# Patient Record
Sex: MALE | Race: WHITE | NOT HISPANIC OR LATINO | Employment: FULL TIME | URBAN - METROPOLITAN AREA
[De-identification: names, ages, dates, MRNs, and addresses within clinical notes are randomized per-mention and may not be internally consistent; named-entity substitution may affect disease eponyms.]

---

## 2017-01-05 ENCOUNTER — OFFICE VISIT (OUTPATIENT)
Dept: ORTHOPEDICS | Facility: CLINIC | Age: 52
End: 2017-01-05
Payer: COMMERCIAL

## 2017-01-05 VITALS — RESPIRATION RATE: 18 BRPM | HEIGHT: 73 IN | BODY MASS INDEX: 28.89 KG/M2 | WEIGHT: 218 LBS

## 2017-01-05 DIAGNOSIS — M25.811 SHOULDER IMPINGEMENT, RIGHT: ICD-10-CM

## 2017-01-05 DIAGNOSIS — M19.019 AC (ACROMIOCLAVICULAR) JOINT ARTHRITIS: ICD-10-CM

## 2017-01-05 DIAGNOSIS — M75.121 COMPLETE TEAR OF RIGHT ROTATOR CUFF: Primary | ICD-10-CM

## 2017-01-05 PROCEDURE — 99203 OFFICE O/P NEW LOW 30 MIN: CPT | Performed by: ORTHOPAEDIC SURGERY

## 2017-01-05 NOTE — MR AVS SNAPSHOT
After Visit Summary   1/5/2017    Jose G Pena    MRN: [id number]           Patient Information     Date Of Birth          1965        Visit Information        Provider Department      1/5/2017 1:15 PM Kavin Lambert MD [facility]        Care Instructions    Rotator cuff repair is done in same day surgery.    Preop physical with primary physician is needed within 30 days of the surgery.  Nothing to eat or drink for 8 hours before surgery.  For same day surgery you need a ride.  Someone should stay with you for 12 hours afterward.  Stop blood thinners 5 days before surgery (aspirin, warfarin, anti-inflammatories).    Stop Plavix at least 10-14 days before surgery.  General anesthesia is used.  They often perform a pain block at the neck to help with pain.  Sutures are in the wound.  Keep wound dry until clinic appointment at 1 1/2 weeks.     Physical Therapy will start after first clinic visit.  0-3 weeks.  Arm in sling or immobilizer.  No reaching with operative arm.   Okay to have arm out to dangle or bend elbow.    3-6 weeks.  Discontinue immobilizer.  Start reaching.  No lifting over 1 lb.  6-12 weeks  Work on strengthening.  1 year to full recovery.    Call [phone] to schedule your surgery.        Follow-ups after your visit        Who to contact     If you have questions or need follow up information about today's clinic visit or your schedule please contact [facility] directly at [phone].  Normal or non-critical lab and imaging results will be communicated to you by Tingzhart, letter or phone within 4 business days after the clinic has received the results. If you do not hear from us within 7 days, please contact the clinic through [email or web address] or phone. If you have a critical or abnormal lab result, we will notify you by phone as soon as possible.  Submit refill requests through Trendyol or call your pharmacy and they will forward the refill request to  "us. Please allow 3 business days for your refill to be completed.          Additional Information About Your Visit        MyChart Information     [employer] lets you send messages to your doctor, view your test results, renew your prescriptions, schedule appointments and more. To sign up, go to [email or web address] . Click on \"Log in\" on the left side of the screen, which will take you to the Welcome page. Then click on \"Sign up Now\" on the right side of the page.     You will be asked to enter the access code listed below, as well as some personal information. Please follow the directions to create your username and password.     Your access code is: [email or web address]  Expires: 3/6/2017 10:27 AM     Your access code will  in 90 days. If you need help or a new code, please call your [facility] clinic or [phone].        Care EveryWhere ID     This is your Care EveryWhere ID. This could be used by other organizations to access your [facility] medical records  [id number]        Your Vitals Were     Respirations Height BMI (Body Mass Index)             18 1.854 m (6' 1\") 28.77 kg/m2          Blood Pressure from Last 3 Encounters:   16 158/86   16 148/98   14 140/90    Weight from Last 3 Encounters:   17 98.884 kg (218 lb)   16 98.884 kg (218 lb)   16 99.791 kg (220 lb)              Today, you had the following     No orders found for display       Primary Care Provider    None       No address on file        Thank you!     Thank you for choosing [facility]  for your care. Our goal is always to provide you with excellent care. Hearing back from our patients is one way we can continue to improve our services. Please take a few minutes to complete the written survey that you may receive in the mail after your visit with us. Thank you!             Your Updated Medication List - Protect others around you: Learn how to safely use, store and throw away your medicines at " www.disposemymeds.org.          This list is accurate as of: 1/5/17  1:51 PM.  Always use your most recent med list.                   Brand Name Dispense Instructions for use    sulfamethoxazole-trimethoprim 800-160 MG per tablet    BACTRIM DS/SEPTRA DS    20 tablet    Take 1 tablet by mouth 2 times daily

## 2017-01-05 NOTE — PATIENT INSTRUCTIONS
Rotator cuff repair is done in same day surgery.    Preop physical with primary physician is needed within 30 days of the surgery.  Nothing to eat or drink for 8 hours before surgery.  For same day surgery you need a ride.  Someone should stay with you for 12 hours afterward.  Stop blood thinners 5 days before surgery (aspirin, warfarin, anti-inflammatories).    Stop Plavix at least 10-14 days before surgery.  General anesthesia is used.  They often perform a pain block at the neck to help with pain.  Sutures are in the wound.  Keep wound dry until clinic appointment at 1 1/2 weeks.     Physical Therapy will start after first clinic visit.  0-3 weeks.  Arm in sling or immobilizer.  No reaching with operative arm.   Okay to have arm out to dangle or bend elbow.    3-6 weeks.  Discontinue immobilizer.  Start reaching.  No lifting over 1 lb.  6-12 weeks  Work on strengthening.  1 year to full recovery.    Call [phone] to schedule your surgery.

## 2017-01-05 NOTE — NURSING NOTE
"Chief Complaint   Patient presents with     Consult     Right shoulder injury/MRI results. Patient states he fell playing hockey on 12/4/16 at the [employer]. Pain level 4/10 dull, achy and episodic. Ice makes the pain better and certain movements makes the pain worse. Injuries 30 yrs. ago playing hockey.       Initial Resp 18  Ht 1.854 m (6' 1\")  Wt 98.884 kg (218 lb)  BMI 28.77 kg/m2 Estimated body mass index is 28.77 kg/(m^2) as calculated from the following:    Height as of this encounter: 1.854 m (6' 1\").    Weight as of this encounter: 98.884 kg (218 lb).  BP completed using cuff size: NA (Not Taken)  Thu Humphrey MA      "

## 2017-01-06 PROBLEM — M25.811 SHOULDER IMPINGEMENT, RIGHT: Status: ACTIVE | Noted: 2017-01-06

## 2017-01-06 PROBLEM — M75.121 COMPLETE TEAR OF RIGHT ROTATOR CUFF: Status: ACTIVE | Noted: 2017-01-06

## 2017-01-06 PROBLEM — M19.019 AC (ACROMIOCLAVICULAR) JOINT ARTHRITIS: Status: ACTIVE | Noted: 2017-01-06

## 2017-01-07 NOTE — PROGRESS NOTES
Jose G Pena is a 51 year old male who is seen in consultation at the request of Dr. Vannesa Camacho for right shoulder pain.    Past Medical History   Diagnosis Date     NO ACTIVE PROBLEMS (aka NONE)        Past Surgical History   Procedure Laterality Date     Surgical history of -        left wrist tendon repair after injury       History reviewed. No pertinent family history.    Social History     Social History     Marital Status:      Spouse Name: N/A     Number of Children: N/A     Years of Education: N/A     Occupational History     Not on file.     Social History Main Topics     Smoking status: Current Every Day Smoker     Smokeless tobacco: Never Used      Comment: occ, not daily     Alcohol Use: Yes      Comment: every other day, 4 beers at a time, average     Drug Use: No     Sexual Activity:     Partners: Female     Other Topics Concern     Parent/Sibling W/ Cabg, Mi Or Angioplasty Before 65f 55m? No     Social History Narrative    Sales.    , Martha    2 children.       Current Outpatient Prescriptions   Medication Sig Dispense Refill     sulfamethoxazole-trimethoprim (BACTRIM DS,SEPTRA DS) 800-160 MG per tablet Take 1 tablet by mouth 2 times daily 20 tablet 0       No Known Allergies    REVIEW OF SYSTEMS:  CONSTITUTIONAL:  NEGATIVE for fever, chills, change in weight, not feeling tired  SKIN:  NEGATIVE for worrisome rashes, no skin lumps, no skin ulcers and no non-healing wounds  EYES:  NEGATIVE for vision changes or irritation.  ENT/MOUTH:  NEGATIVE.  No hearing loss, no hoarseness, no difficulty swallowing.  RESP:  NEGATIVE. No cough or shortness of breath.  BREAST:  NEGATIVE for masses, tenderness or discharge  CV:  NEGATIVE for chest pain, palpitations or peripheral edema  GI:  NEGATIVE for nausea, abdominal pain, heartburn, or change in bowel habits  :  Negative. No dysuria, no hematuria  MUSCULOSKELETAL:  See HPI above  NEURO:  NEGATIVE . No headaches, no dizziness,  no  "numbness  ENDOCRINE:  NEGATIVE for temperature intolerance, skin/hair changes  HEME/ALLERGY/IMMUNE:  NEGATIVE for bleeding problems  PSYCHIATRIC:  NEGATIVE. no anxiety, no depression.     Exam:  Vitals: Resp 18  Ht 1.854 m (6' 1\")  Wt 98.884 kg (218 lb)  BMI 28.77 kg/m2  BMI= Body mass index is 28.77 kg/(m^2).  Constitutional:  healthy, alert and no distress  Neuro: Alert and Oriented x 3, Gait normal. Sensation grossly WNL.  Psych: Affect normal   Respiratory: Breathing not labored.  Cardiovascular: normal peripheral pulses  Lymph: no adenopathy  Skin: No rashes,worrisome lesions or skin problems    "

## 2017-01-07 NOTE — PROGRESS NOTES
DATE OF VISIT:  2017.        HISTORY OF PRESENT ILLNESS:  Nam Sutherland is a 51-year-old male referred from Dr. Vannesa Camacho for evaluation of right shoulder pain.  This started when he fell playing [place] [employer] [place] .  He had pain in the shoulder after that, has dull aching pain rated 4/10.  It hurts when he moves the arm too far, better with ice.  He has had similar problems 30 years ago.  Because of persistent pain, he had MRI scan of the shoulder.  This showed a 2.7 x 2.7 cm rotator cuff tear in the supraspinatus and infraspinatus.  There is some degenerative tendinopathy as well.  There is a possible tear of the upper portion of the subscapularis tendon with some fatty atrophy of the subscapularis.  There was a type 2 acromion.  No significant acromioclavicular arthrosis.  Biceps tendon appeared normal position.      PHYSICAL EXAMINATION:  Tenderness in the subacromial space on the right, also tenderness of the AC joint and prominence of the AC joint.  He has pain with resisted external rotation and mildly with abduction of the shoulder.  There is no pain with resisted internal rotation.  He has negative anterior and posterior apprehension testing.  There is no increased warmth or erythema.  Sensation and circulation are intact.      IMPRESSION:     1.  Right rotator cuff tear.   2.  Impingement.   3.  Clinically, he has acromioclavicular arthrosis but by MRI scan does not have significant overgrowth.      PLAN:  We discussed options and will proceed with right rotator cuff repair and likely acromioplasty and distal clavicle excision at the same time.  He will have a history and physical with his family physician.  We have gone over the recovery expected today.         RAYSA DOS SANTOS MD             D: 2017 20:48   T: 2017 01:26   MT:       Name:     NAM SUTHERLAND   MRN:      [id number]        Account:      [id number]   :      1965           Visit Date:    01/05/2017      Document: [id number]

## 2017-01-11 ENCOUNTER — TELEPHONE (OUTPATIENT)
Dept: ORTHOPEDICS | Facility: CLINIC | Age: 52
End: 2017-01-11

## 2017-02-13 ENCOUNTER — OFFICE VISIT (OUTPATIENT)
Dept: FAMILY MEDICINE | Facility: CLINIC | Age: 52
End: 2017-02-13
Payer: COMMERCIAL

## 2017-02-13 VITALS
HEIGHT: 73 IN | SYSTOLIC BLOOD PRESSURE: 150 MMHG | TEMPERATURE: 98.7 F | BODY MASS INDEX: 26.9 KG/M2 | WEIGHT: 203 LBS | HEART RATE: 90 BPM | DIASTOLIC BLOOD PRESSURE: 94 MMHG

## 2017-02-13 DIAGNOSIS — Z01.818 PREOP GENERAL PHYSICAL EXAM: Primary | ICD-10-CM

## 2017-02-13 DIAGNOSIS — M75.101 TEAR OF RIGHT ROTATOR CUFF, UNSPECIFIED TEAR EXTENT: ICD-10-CM

## 2017-02-13 LAB — HGB BLD-MCNC: 15.3 G/DL (ref 13.3–17.7)

## 2017-02-13 PROCEDURE — 99214 OFFICE O/P EST MOD 30 MIN: CPT | Performed by: PHYSICIAN ASSISTANT

## 2017-02-13 PROCEDURE — 36415 COLL VENOUS BLD VENIPUNCTURE: CPT | Performed by: PHYSICIAN ASSISTANT

## 2017-02-13 PROCEDURE — 85018 HEMOGLOBIN: CPT | Performed by: PHYSICIAN ASSISTANT

## 2017-02-13 NOTE — MR AVS SNAPSHOT
After Visit Summary   2/13/2017    Jose G Pena    MRN: [id number]           Patient Information     Date Of Birth          1965        Visit Information        Provider Department      2/13/2017 10:00 AM Tracy Do PA-C [facility] [place]        Today's Diagnoses     Preop general physical exam    -  1    Tear of right rotator cuff, unspecified tear extent          Care Instructions      Before Your Surgery      Call your surgeon if there is any change in your health. This includes signs of a cold or flu (such as a sore throat, runny nose, cough, rash or fever).    Do not smoke, drink alcohol or take over the counter medicine (unless your surgeon or primary care doctor tells you to) for the 24 hours before and after surgery.    If you take prescribed drugs: Follow your doctor s orders about which medicines to take and which to stop until after surgery.    Eating and drinking prior to surgery: follow the instructions from your surgeon    Take a shower or bath the night before surgery. Use the soap your surgeon gave you to gently clean your skin. If you do not have soap from your surgeon, use your regular soap. Do not shave or scrub the surgery site.  Wear clean pajamas and have clean sheets on your bed.         Follow-ups after your visit        Your next 10 appointments already scheduled     Feb 17, 2017   Procedure with Kavin Lambert MD   [facility] (--)    [address] [place] [address]   [phone]            Mar 02, 2017  1:15 PM CST   Return Visit with Kavin Lambert MD   [facility]    [address]  [place] [address] [phone]              Who to contact     Normal or non-critical lab and imaging results will be communicated to you by MyChart, letter or phone within 4 business days after the clinic has received the results. If you do not hear from us within 7 days, please  "contact the clinic through Jetabroad or phone. If you have a critical or abnormal lab result, we will notify you by phone as soon as possible.  Submit refill requests through Jet[employer] or call your pharmacy and they will forward the refill request to us. Please allow 3 business days for your refill to be completed.          If you need to speak with a  for additional information , please call: [phone]             Additional Information About Your Visit        Jet[employer] Information     [employer] lets you send messages to your doctor, view your test results, renew your prescriptions, schedule appointments and more. To sign up, go to [email or web address] . Click on \"Log in\" on the left side of the screen, which will take you to the Welcome page. Then click on \"Sign up Now\" on the right side of the page.     You will be asked to enter the access code listed below, as well as some personal information. Please follow the directions to create your username and password.     Your access code is: [email or web address]  Expires: 3/6/2017 10:27 AM     Your access code will  in 90 days. If you need help or a new code, please call your [facility] clinic or [phone].        Care EveryWhere ID     This is your Care EveryWhere ID. This could be used by other organizations to access your [facility] medical records  [id number]        Your Vitals Were     Pulse Temperature Height BMI (Body Mass Index)          90 98.7  F (37.1  C) (Oral) 6' 1\" (1.854 m) 26.78 kg/m2         Blood Pressure from Last 3 Encounters:   17 (!) 162/93   16 158/86   16 (!) 148/98    Weight from Last 3 Encounters:   17 203 lb (92.1 kg)   17 218 lb (98.9 kg)   16 218 lb (98.9 kg)              We Performed the Following     Hemoglobin        Primary Care Provider    None       No address on file        Thank you!     Thank you for choosing [facility]  for your care. Our goal is always to provide you " with excellent care. Hearing back from our patients is one way we can continue to improve our services. Please take a few minutes to complete the written survey that you may receive in the mail after your visit with us. Thank you!             Your Updated Medication List - Protect others around you: Learn how to safely use, store and throw away your medicines at www.disposemymeds.org.      Notice  As of 2/13/2017 10:19 AM    You have not been prescribed any medications.

## 2017-02-13 NOTE — NURSING NOTE
"Chief Complaint   Patient presents with     Pre-Op Exam       Initial BP (!) 162/93 (BP Location: Left arm)  Pulse 90  Temp 98.7  F (37.1  C) (Oral)  Ht 6' 1\" (1.854 m)  Wt 203 lb (92.1 kg)  BMI 26.78 kg/m2 Estimated body mass index is 26.78 kg/(m^2) as calculated from the following:    Height as of this encounter: 6' 1\" (1.854 m).    Weight as of this encounter: 203 lb (92.1 kg).  Medication Reconciliation: complete   Joann Izaguirre MA      "

## 2017-02-13 NOTE — PROGRESS NOTES
[facility] [place]  [address]  [place] [address]  [phone]  Dept: [phone]    PRE-OP EVALUATION:  Today's date: 2017    Jose G Pena (: 1965) presents for pre-operative evaluation assessment as requested by Dr. Lambert.  He requires evaluation and anesthesia risk assessment prior to undergoing surgery/procedure for treatment of Right Rotator Cuff Tear.  Proposed procedure: COMBINED ARTHROTOMY SHOULDER, ROTATOR CUFF REPAIR    Date of Surgery/ Procedure: 17  Time of Surgery/ Procedure: 7:30am  Hospital/Surgical Facility: [facility]   Fax number for surgical facility:  Primary Physician: None  Type of Anesthesia Anticipated: to be determined    Patient has a Health Care Directive or Living Will:  NO    1. NO - Do you have a history of heart attack, stroke, stent, bypass or surgery on an artery in the head, neck, heart or legs?  2. NO - Do you ever have any pain or discomfort in your chest?  3. NO - Do you have a history of  Heart Failure?  4. NO - Are you troubled by shortness of breath when: walking on the level, up a slight hill or at night?  5. NO - Do you currently have a cold, bronchitis or other respiratory infection?  6. NO - Do you have a cough, shortness of breath or wheezing?  7. NO - Do you sometimes get pains in the calves of your legs when you walk?  8. NO - Do you or anyone in your family have previous history of blood clots?  9. NO - Do you or does anyone in your family have a serious bleeding problem such as prolonged bleeding following surgeries or cuts?  10. NO - Have you ever had problems with anemia or been told to take iron pills?  11. NO - Have you had any abnormal blood loss such as black, tarry or bloody stools, or abnormal vaginal bleeding?  12. NO - Have you ever had a blood transfusion?  13. NO - Have you or any of your relatives ever had problems with anesthesia?  14. NO - Do you have sleep apnea, excessive snoring or  daytime drowsiness?  15. NO - Do you have any prosthetic heart valves?  16. NO - Do you have prosthetic joints?  17. NO - Is there any chance that you may be pregnant?      HPI:                                                      Brief HPI related to upcoming procedure: Torn rotator cuff, right      See problem list for active medical problems.  Problems all longstanding and stable, except as noted/documented.  See ROS for pertinent symptoms related to these conditions.                                                                                                  .    MEDICAL HISTORY:                                                      Patient Active Problem List    Diagnosis Date Noted     AC (acromioclavicular) joint arthritis 01/06/2017     Priority: Medium     Shoulder impingement, right 01/06/2017     Priority: Medium     Complete tear of right rotator cuff 01/06/2017     Priority: Medium     Tobacco abuse 06/13/2014     Priority: Medium     CARDIOVASCULAR SCREENING; LDL GOAL LESS THAN 160 10/31/2010     Priority: Medium      Past Medical History   Diagnosis Date     NO ACTIVE PROBLEMS (aka NONE)      Past Surgical History   Procedure Laterality Date     Surgical history of -        left wrist tendon repair after injury     No current outpatient prescriptions on file.     OTC products: None, except as noted above    No Known Allergies   Latex Allergy: NO    Social History   Substance Use Topics     Smoking status: Current Every Day Smoker     Smokeless tobacco: Never Used      Comment: occ, not daily     Alcohol use Yes      Comment: every other day, 4 beers at a time, average     History   Drug Use No       REVIEW OF SYSTEMS:                                                    Constitutional, neuro, ENT, endocrine, pulmonary, cardiac, gastrointestinal, genitourinary, musculoskeletal, integument and psychiatric systems are negative, except as otherwise noted.    EXAM:                                           "          BP (!) 162/93 (BP Location: Left arm)  Pulse 90  Temp 98.7  F (37.1  C) (Oral)  Ht 6' 1\" (1.854 m)  Wt 203 lb (92.1 kg)  BMI 26.78 kg/m2    GENERAL APPEARANCE: healthy, alert and no distress     EYES: EOMI, - PERRL     HENT: ear canals and TM's normal and nose and mouth without ulcers or lesions     NECK: no adenopathy, no asymmetry, masses, or scars and thyroid normal to palpation     RESP: lungs clear to auscultation - no rales, rhonchi or wheezes     CV: regular rates and rhythm, normal S1 S2, no S3 or S4 and no murmur, click or rub -     ABDOMEN:  soft, nontender, no HSM or masses and bowel sounds normal     MS: extremities normal- no gross deformities noted, no evidence of inflammation in joints, FROM in all extremities.     SKIN: no suspicious lesions or rashes     NEURO: Normal strength and tone, sensory exam grossly normal, mentation intact and speech normal     PSYCH: mentation appears normal. and affect normal/bright     LYMPHATICS: normal ant/post cervical, supraclavicular nodes    DIAGNOSTICS:                                                      Hemoglobin   Date Value Ref Range Status   02/13/2017 15.3 13.3 - 17.7 g/dL Final       IMPRESSION:                                                    Reason for surgery/procedure: COMBINED ARTHROTOMY SHOULDER, ROTATOR CUFF REPAIR  Diagnosis/reason for consult: Pre op consult    The proposed surgical procedure is considered INTERMEDIATE risk.    REVISED CARDIAC RISK INDEX  The patient has the following serious cardiovascular risks for perioperative complications such as (MI, PE, VFib and 3  AV Block):  No serious cardiac risks  INTERPRETATION: 0 risks: Class I (very low risk - 0.4% complication rate)    The patient has the following additional risks for perioperative complications:  No identified additional risks      ICD-10-CM    1. Preop general physical exam Z01.818 Hemoglobin   2. Tear of right rotator cuff, unspecified tear extent M75.101  "       RECOMMENDATIONS:                                                      APPROVAL GIVEN to proceed with proposed procedure, without further diagnostic evaluation    Patient will follow up after surgery to discuss his blood pressure further.       Signed Electronically by: Tracy Do PA-C    Copy of this evaluation report is provided to requesting physician.    [facility] Preop Guidelines

## 2017-02-14 ENCOUNTER — ANESTHESIA EVENT (OUTPATIENT)
Dept: SURGERY | Facility: AMBULATORY SURGERY CENTER | Age: 52
End: 2017-02-14

## 2017-02-17 ENCOUNTER — ANESTHESIA (OUTPATIENT)
Dept: SURGERY | Facility: AMBULATORY SURGERY CENTER | Age: 52
End: 2017-02-17

## 2017-02-17 ENCOUNTER — SURGERY (OUTPATIENT)
Age: 52
End: 2017-02-17

## 2017-02-17 ENCOUNTER — HOSPITAL ENCOUNTER (OUTPATIENT)
Facility: AMBULATORY SURGERY CENTER | Age: 52
Discharge: HOME | End: 2017-02-17
Attending: ORTHOPAEDIC SURGERY | Admitting: ORTHOPAEDIC SURGERY
Payer: COMMERCIAL

## 2017-02-17 VITALS
OXYGEN SATURATION: 94 % | TEMPERATURE: 97.9 F | RESPIRATION RATE: 12 BRPM | SYSTOLIC BLOOD PRESSURE: 115 MMHG | DIASTOLIC BLOOD PRESSURE: 68 MMHG

## 2017-02-17 DIAGNOSIS — M19.019 AC (ACROMIOCLAVICULAR) JOINT ARTHRITIS: Primary | ICD-10-CM

## 2017-02-17 DIAGNOSIS — M75.121 COMPLETE TEAR OF RIGHT ROTATOR CUFF: ICD-10-CM

## 2017-02-17 DIAGNOSIS — M25.811 SHOULDER IMPINGEMENT, RIGHT: ICD-10-CM

## 2017-02-17 PROCEDURE — 23130 ACROMP/ACROMIONECTOMY PRTL: CPT | Mod: XU,RT

## 2017-02-17 PROCEDURE — G8907 PT DOC NO EVENTS ON DISCHARG: HCPCS

## 2017-02-17 PROCEDURE — 23120 CLAVICULECTOMY PARTIAL: CPT | Mod: RT

## 2017-02-17 PROCEDURE — 23412 REPAIR ROTATOR CUFF CHRONIC: CPT | Mod: RT

## 2017-02-17 PROCEDURE — G8916 PT W IV AB GIVEN ON TIME: HCPCS

## 2017-02-17 PROCEDURE — 23120 CLAVICULECTOMY PARTIAL: CPT | Mod: 59 | Performed by: ORTHOPAEDIC SURGERY

## 2017-02-17 PROCEDURE — 23130 ACROMP/ACROMIONECTOMY PRTL: CPT | Mod: 59 | Performed by: ORTHOPAEDIC SURGERY

## 2017-02-17 PROCEDURE — 23412 REPAIR ROTATOR CUFF CHRONIC: CPT | Mod: RT | Performed by: ORTHOPAEDIC SURGERY

## 2017-02-17 DEVICE — IMPLANTABLE DEVICE: Type: IMPLANTABLE DEVICE | Site: SHOULDER | Status: FUNCTIONAL

## 2017-02-17 RX ORDER — FENTANYL CITRATE 50 UG/ML
25-50 INJECTION, SOLUTION INTRAMUSCULAR; INTRAVENOUS
Status: DISCONTINUED | OUTPATIENT
Start: 2017-02-17 | End: 2017-02-18 | Stop reason: HOSPADM

## 2017-02-17 RX ORDER — ACETAMINOPHEN 325 MG/1
975 TABLET ORAL ONCE
Status: COMPLETED | OUTPATIENT
Start: 2017-02-17 | End: 2017-02-17

## 2017-02-17 RX ORDER — FLUMAZENIL 0.1 MG/ML
0.2 INJECTION, SOLUTION INTRAVENOUS
Status: DISCONTINUED | OUTPATIENT
Start: 2017-02-17 | End: 2017-02-18 | Stop reason: HOSPADM

## 2017-02-17 RX ORDER — GABAPENTIN 300 MG/1
300 CAPSULE ORAL ONCE
Status: COMPLETED | OUTPATIENT
Start: 2017-02-17 | End: 2017-02-17

## 2017-02-17 RX ORDER — PROPOFOL 10 MG/ML
INJECTION, EMULSION INTRAVENOUS PRN
Status: DISCONTINUED | OUTPATIENT
Start: 2017-02-17 | End: 2017-02-17

## 2017-02-17 RX ORDER — NALOXONE HYDROCHLORIDE 0.4 MG/ML
.1-.4 INJECTION, SOLUTION INTRAMUSCULAR; INTRAVENOUS; SUBCUTANEOUS
Status: DISCONTINUED | OUTPATIENT
Start: 2017-02-17 | End: 2017-02-17 | Stop reason: CLARIF

## 2017-02-17 RX ORDER — FENTANYL CITRATE 50 UG/ML
25-50 INJECTION, SOLUTION INTRAMUSCULAR; INTRAVENOUS
Status: DISCONTINUED | OUTPATIENT
Start: 2017-02-17 | End: 2017-02-17

## 2017-02-17 RX ORDER — SODIUM CHLORIDE, SODIUM LACTATE, POTASSIUM CHLORIDE, CALCIUM CHLORIDE 600; 310; 30; 20 MG/100ML; MG/100ML; MG/100ML; MG/100ML
INJECTION, SOLUTION INTRAVENOUS CONTINUOUS
Status: DISCONTINUED | OUTPATIENT
Start: 2017-02-17 | End: 2017-02-18 | Stop reason: HOSPADM

## 2017-02-17 RX ORDER — DEXAMETHASONE SODIUM PHOSPHATE 4 MG/ML
INJECTION, SOLUTION INTRA-ARTICULAR; INTRALESIONAL; INTRAMUSCULAR; INTRAVENOUS; SOFT TISSUE PRN
Status: DISCONTINUED | OUTPATIENT
Start: 2017-02-17 | End: 2017-02-17

## 2017-02-17 RX ORDER — LIDOCAINE 40 MG/G
CREAM TOPICAL
Status: DISCONTINUED | OUTPATIENT
Start: 2017-02-17 | End: 2017-02-18 | Stop reason: HOSPADM

## 2017-02-17 RX ORDER — HYDROMORPHONE HYDROCHLORIDE 1 MG/ML
.3-.5 INJECTION, SOLUTION INTRAMUSCULAR; INTRAVENOUS; SUBCUTANEOUS EVERY 10 MIN PRN
Status: DISCONTINUED | OUTPATIENT
Start: 2017-02-17 | End: 2017-02-18 | Stop reason: HOSPADM

## 2017-02-17 RX ORDER — LIDOCAINE HYDROCHLORIDE 20 MG/ML
INJECTION, SOLUTION INFILTRATION; PERINEURAL PRN
Status: DISCONTINUED | OUTPATIENT
Start: 2017-02-17 | End: 2017-02-17

## 2017-02-17 RX ORDER — ONDANSETRON 2 MG/ML
4 INJECTION INTRAMUSCULAR; INTRAVENOUS EVERY 30 MIN PRN
Status: DISCONTINUED | OUTPATIENT
Start: 2017-02-17 | End: 2017-02-18 | Stop reason: HOSPADM

## 2017-02-17 RX ORDER — ONDANSETRON 4 MG/1
4 TABLET, ORALLY DISINTEGRATING ORAL EVERY 30 MIN PRN
Status: DISCONTINUED | OUTPATIENT
Start: 2017-02-17 | End: 2017-02-18 | Stop reason: HOSPADM

## 2017-02-17 RX ORDER — OXYCODONE AND ACETAMINOPHEN 5; 325 MG/1; MG/1
1-2 TABLET ORAL EVERY 4 HOURS PRN
Qty: 60 TABLET | Refills: 0 | Status: SHIPPED | OUTPATIENT
Start: 2017-02-17 | End: 2017-12-28

## 2017-02-17 RX ORDER — NALOXONE HYDROCHLORIDE 0.4 MG/ML
.1-.4 INJECTION, SOLUTION INTRAMUSCULAR; INTRAVENOUS; SUBCUTANEOUS
Status: DISCONTINUED | OUTPATIENT
Start: 2017-02-17 | End: 2017-02-18 | Stop reason: HOSPADM

## 2017-02-17 RX ORDER — MEPERIDINE HYDROCHLORIDE 25 MG/ML
12.5 INJECTION INTRAMUSCULAR; INTRAVENOUS; SUBCUTANEOUS
Status: DISCONTINUED | OUTPATIENT
Start: 2017-02-17 | End: 2017-02-18 | Stop reason: HOSPADM

## 2017-02-17 RX ORDER — CEFAZOLIN SODIUM 2 G/100ML
2 INJECTION, SOLUTION INTRAVENOUS
Status: COMPLETED | OUTPATIENT
Start: 2017-02-17 | End: 2017-02-17

## 2017-02-17 RX ORDER — KETOROLAC TROMETHAMINE 30 MG/ML
INJECTION, SOLUTION INTRAMUSCULAR; INTRAVENOUS PRN
Status: DISCONTINUED | OUTPATIENT
Start: 2017-02-17 | End: 2017-02-17

## 2017-02-17 RX ORDER — FLUMAZENIL 0.1 MG/ML
0.2 INJECTION, SOLUTION INTRAVENOUS
Status: DISCONTINUED | OUTPATIENT
Start: 2017-02-17 | End: 2017-02-17 | Stop reason: CLARIF

## 2017-02-17 RX ORDER — ONDANSETRON 2 MG/ML
INJECTION INTRAMUSCULAR; INTRAVENOUS PRN
Status: DISCONTINUED | OUTPATIENT
Start: 2017-02-17 | End: 2017-02-17

## 2017-02-17 RX ORDER — HYDRALAZINE HYDROCHLORIDE 20 MG/ML
2.5-5 INJECTION INTRAMUSCULAR; INTRAVENOUS EVERY 10 MIN PRN
Status: DISCONTINUED | OUTPATIENT
Start: 2017-02-17 | End: 2017-02-18 | Stop reason: HOSPADM

## 2017-02-17 RX ORDER — EPHEDRINE SULFATE 50 MG/ML
INJECTION, SOLUTION INTRAMUSCULAR; INTRAVENOUS; SUBCUTANEOUS PRN
Status: DISCONTINUED | OUTPATIENT
Start: 2017-02-17 | End: 2017-02-17

## 2017-02-17 RX ORDER — HYDROXYZINE PAMOATE 25 MG/1
25 CAPSULE ORAL 3 TIMES DAILY PRN
Qty: 30 CAPSULE | Refills: 1 | Status: SHIPPED | OUTPATIENT
Start: 2017-02-17 | End: 2017-12-28

## 2017-02-17 RX ORDER — ACETAMINOPHEN 325 MG/1
975 TABLET ORAL ONCE
Status: DISCONTINUED | OUTPATIENT
Start: 2017-02-17 | End: 2017-02-18 | Stop reason: HOSPADM

## 2017-02-17 RX ORDER — OXYCODONE HYDROCHLORIDE 5 MG/1
5 TABLET ORAL
Status: DISCONTINUED | OUTPATIENT
Start: 2017-02-17 | End: 2017-02-18 | Stop reason: HOSPADM

## 2017-02-17 RX ADMIN — SODIUM CHLORIDE, SODIUM LACTATE, POTASSIUM CHLORIDE, CALCIUM CHLORIDE: 600; 310; 30; 20 INJECTION, SOLUTION INTRAVENOUS at 07:06

## 2017-02-17 RX ADMIN — GABAPENTIN 300 MG: 300 CAPSULE ORAL at 06:30

## 2017-02-17 RX ADMIN — FENTANYL CITRATE 50 MCG: 50 INJECTION, SOLUTION INTRAMUSCULAR; INTRAVENOUS at 07:08

## 2017-02-17 RX ADMIN — PROPOFOL 200 MG: 10 INJECTION, EMULSION INTRAVENOUS at 07:08

## 2017-02-17 RX ADMIN — FENTANYL CITRATE 25 MCG: 50 INJECTION, SOLUTION INTRAMUSCULAR; INTRAVENOUS at 07:43

## 2017-02-17 RX ADMIN — EPHEDRINE SULFATE 5 MG: 50 INJECTION, SOLUTION INTRAMUSCULAR; INTRAVENOUS; SUBCUTANEOUS at 07:52

## 2017-02-17 RX ADMIN — ACETAMINOPHEN 975 MG: 325 TABLET ORAL at 06:30

## 2017-02-17 RX ADMIN — LIDOCAINE HYDROCHLORIDE 60 MG: 20 INJECTION, SOLUTION INFILTRATION; PERINEURAL at 07:08

## 2017-02-17 RX ADMIN — KETOROLAC TROMETHAMINE 30 MG: 30 INJECTION, SOLUTION INTRAMUSCULAR; INTRAVENOUS at 09:33

## 2017-02-17 RX ADMIN — SODIUM CHLORIDE, SODIUM LACTATE, POTASSIUM CHLORIDE, CALCIUM CHLORIDE: 600; 310; 30; 20 INJECTION, SOLUTION INTRAVENOUS at 09:20

## 2017-02-17 RX ADMIN — OXYCODONE HYDROCHLORIDE 5 MG: 5 TABLET ORAL at 10:22

## 2017-02-17 RX ADMIN — FENTANYL CITRATE 25 MCG: 50 INJECTION, SOLUTION INTRAMUSCULAR; INTRAVENOUS at 08:40

## 2017-02-17 RX ADMIN — CEFAZOLIN SODIUM 2 G: 2 INJECTION, SOLUTION INTRAVENOUS at 07:18

## 2017-02-17 RX ADMIN — DEXAMETHASONE SODIUM PHOSPHATE 8 MG: 4 INJECTION, SOLUTION INTRA-ARTICULAR; INTRALESIONAL; INTRAMUSCULAR; INTRAVENOUS; SOFT TISSUE at 07:13

## 2017-02-17 RX ADMIN — ONDANSETRON 4 MG: 2 INJECTION INTRAMUSCULAR; INTRAVENOUS at 09:33

## 2017-02-17 RX ADMIN — FENTANYL CITRATE 50 MCG: 50 INJECTION, SOLUTION INTRAMUSCULAR; INTRAVENOUS at 06:44

## 2017-02-17 RX ADMIN — EPHEDRINE SULFATE 5 MG: 50 INJECTION, SOLUTION INTRAMUSCULAR; INTRAVENOUS; SUBCUTANEOUS at 08:00

## 2017-02-17 RX ADMIN — SODIUM CHLORIDE, SODIUM LACTATE, POTASSIUM CHLORIDE, CALCIUM CHLORIDE: 600; 310; 30; 20 INJECTION, SOLUTION INTRAVENOUS at 06:15

## 2017-02-17 RX ADMIN — CEFAZOLIN SODIUM 1 G: 2 INJECTION, SOLUTION INTRAVENOUS at 09:14

## 2017-02-17 ASSESSMENT — LIFESTYLE VARIABLES: TOBACCO_USE: 1

## 2017-02-17 NOTE — ANESTHESIA CARE TRANSFER NOTE
Patient: Jose G Pena    Procedure(s):  Right Rotator Cuff Repair Salma Luquillo    - Wound Class: I-Clean    Diagnosis: Right Rotator Cuff Tear   Diagnosis Additional Information: No value filed.    Anesthesia Type:   General, ETT, Periph. Nerve Block for postop pain, Peripheral Nerve Block     Note:  Airway :Nasal Cannula  Patient transferred to:Phase II  Comments: To PACU, awake, comfortable, sitting up, sats 100%, NC, Report to RN.      Vitals: (Last set prior to Anesthesia Care Transfer)    CRNA VITALS  2/17/2017 0926 - 2/17/2017 1001      2/17/2017             Pulse: 95    SpO2: 99 %                Electronically Signed By: CHUY Hart CRNA  February 17, 2017  10:01 AM

## 2017-02-17 NOTE — ANESTHESIA POSTPROCEDURE EVALUATION
Patient: Jose G Pena    Procedure(s):  Right Rotator Cuff Repair Salma Lonedell    - Wound Class: I-Clean    Diagnosis:Right Rotator Cuff Tear   Diagnosis Additional Information: No value filed.    Anesthesia Type:  General, Periph. Nerve Block for postop pain, Peripheral Nerve Block    Note:  Anesthesia Post Evaluation    Patient location during evaluation: PACU  Patient participation: Able to fully participate in evaluation  Level of consciousness: awake  Pain management: adequate  Airway patency: patent  Cardiovascular status: acceptable  Respiratory status: acceptable  Hydration status: acceptable  PONV: none     Anesthetic complications: None    Comments: Mild pain on upper part of shoulder.  Otherwise doing well.        Last vitals:  Vitals:    02/17/17 0700 02/17/17 0958 02/17/17 1000   BP: 130/85 (!) 137/92 138/85   Resp: 12 20 16   Temp:  98.5  F (36.9  C)    SpO2: 98% 96% 96%         Electronically Signed By: Kaivn Mayo MD  February 17, 2017  10:31 AM

## 2017-02-17 NOTE — IP AVS SNAPSHOT
MRN:[id number]                      After Visit Summary   2/17/2017    Jose G Pena    MRN: [id number]           Thank you!     Thank you for choosing [facility] for your care. Our goal is always to provide you with excellent care. Hearing back from our patients is one way we can continue to improve our services. Please take a few minutes to complete the written survey that you may receive in the mail after you visit with us. Thank you!        Patient Information     Date Of Birth          1965        About your hospital stay     You were admitted on:  February 17, 2017 You last received care in the:  [facility]    You were discharged on:  February 17, 2017       Who to Call     For medical emergencies, please call 911.  For non-urgent questions about your medical care, please call your primary care provider or clinic, None  For questions related to your surgery, please call your surgery clinic        Attending Provider     Provider Specialty    Kavin Lambert MD Orthopedics       Primary Care Provider    None       No address on file        After Care Instructions     Discharge Instructions       Review outpatient procedure discharge instructions with patient as directed by Provider            Dressing Change       Change dressing on third day after surgery.            Ice to affected area       Ice pack to surgical site every 15 minutes per hour for 24 hours            Return to clinic       Return to clinic 10-14 days with Dr. Kavin Lambert [phone]                  Your next 10 appointments already scheduled     Mar 02, 2017  1:15 PM CST   Return Visit with Kavin Lambert MD   [facility] ([facility])    [address]  [place] [address]   [phone]              Further instructions from your care team       [facility]  Same-Day Surgery   Adult Discharge Orders & Instructions   For 24  hours after surgery  1. Get plenty of rest.  A responsible adult must stay with you for at least 24 hours after you leave the hospital.   2. Do not drive or use heavy equipment.  If you have weakness or tingling, don't drive or use heavy equipment until this feeling goes away.  3. Do not drink alcohol.  4. Avoid strenuous or risky activities.  Ask for help when climbing stairs.   5. You may feel lightheaded.  IF so, sit for a few minutes before standing.  Have someone help you get up.   6. If you have nausea (feel sick to your stomach): Drink only clear liquids such as apple juice, ginger ale, broth or 7-Up.  Rest may also help.  Be sure to drink enough fluids.  Move to a regular diet as you feel able.  7. You may have a slight fever. Call the doctor if your fever is over 100 F (37.7 C) (taken under the tongue) or lasts longer than 24 hours.  8. You may have a dry mouth, a sore throat, muscle aches or trouble sleeping.  These should go away after 24 hours.  9. Do not make important or legal decisions.   Call your doctor for any of the followin.  Signs of infection (fever, growing tenderness at the surgery site, a large amount of drainage or bleeding, severe pain, foul-smelling drainage, redness, swelling).    2. It has been over 8 to 10 hours since surgery and you are still not able to urinate (pass water).    3.  Headache for over 24 hours.    4.  Numbness, tingling or weakness the day after surgery (if you had spinal anesthesia).  To contact a doctor, call   [phone] or [phone] (Office)      Pending Results     No orders found from 2/15/2017 to 2017.            Admission Information     Date & Time Provider Department Dept. Phone    2017 Kavin Lambert MD [facility] [place] [phone]      Your Vitals Were     Blood Pressure Temperature Respirations Pulse Oximetry          138/85 98.5  F (36.9  C) (Temporal) 16 96%        MyChart Information     Pint Please lets you send  "messages to your doctor, view your test results, renew your prescriptions, schedule appointments and more. To sign up, go to [email or web address] . Click on \"Log in\" on the left side of the screen, which will take you to the Welcome page. Then click on \"Sign up Now\" on the right side of the page.     You will be asked to enter the access code listed below, as well as some personal information. Please follow the directions to create your username and password.     Your access code is: [email or web address]  Expires: 3/6/2017 10:27 AM     Your access code will  in 90 days. If you need help or a new code, please call your [place] clinic or [phone].        Care EveryWhere ID     This is your Care EveryWhere ID. This could be used by other organizations to access your [place] medical records  [id number]           Review of your medicines      START taking        Dose / Directions    hydrOXYzine 25 MG capsule   Commonly known as:  VISTARIL   Used for:  AC (acromioclavicular) joint arthritis, Shoulder impingement, right, Complete tear of right rotator cuff        Dose:  25 mg   Take 1 capsule (25 mg) by mouth 3 times daily as needed (spasm)   Quantity:  30 capsule   Refills:  1       oxyCODONE-acetaminophen 5-325 MG per tablet   Commonly known as:  PERCOCET   Used for:  AC (acromioclavicular) joint arthritis, Shoulder impingement, right, Complete tear of right rotator cuff        Dose:  1-2 tablet   Take 1-2 tablets by mouth every 4 hours as needed for pain (moderate to severe)   Quantity:  60 tablet   Refills:  0            Where to get your medicines      Some of these will need a paper prescription and others can be bought over the counter. Ask your nurse if you have questions.     Bring a paper prescription for each of these medications     hydrOXYzine 25 MG capsule    oxyCODONE-acetaminophen 5-325 MG per tablet                Protect others around you: Learn how to safely use, store and throw away your " medicines at www.disposemymeds.org.             Medication List: This is a list of all your medications and when to take them. Check marks below indicate your daily home schedule. Keep this list as a reference.      Medications           Morning Afternoon Evening Bedtime As Needed    hydrOXYzine 25 MG capsule   Commonly known as:  VISTARIL   Take 1 capsule (25 mg) by mouth 3 times daily as needed (spasm)                                oxyCODONE-acetaminophen 5-325 MG per tablet   Commonly known as:  PERCOCET   Take 1-2 tablets by mouth every 4 hours as needed for pain (moderate to severe)

## 2017-02-17 NOTE — BRIEF OP NOTE
PROCEDURE NOTE:    Pre-operative diagnosis: Right Rotator Cuff Tear    Post-operative diagnosis: Right rotator cuff tear   Procedure: Procedure(s):  Right rotator cuff repair with distal clavicle excision and acromioplasty   Surgeon: Kavin Lambert MD   Assistant(s): Nader Yost PA-C   Estimated blood loss: 40 mL   Specimens: None   Findings: Right rotator cuff tear, acromioclavicular arthrosis, right shoulder impingement   Anesthesia: General endotracheal anesthesia   Drains: None   Complications: None   Weight bearing status: Weight bearing as tolerated   Activity: Wear immobilizer for three weeks.  No reaching forward with surgical arm.  Okay to have immobilizer off for pendulum exercises.     Nader Yost PA-C  Supervising physician: Kavin Lambert MD  Dept. of Orthopedics  [facility]

## 2017-02-17 NOTE — DISCHARGE INSTRUCTIONS
.  [facility]  Same-Day Surgery   Adult Discharge Orders & Instructions   For 24 hours after surgery  1. Get plenty of rest.  A responsible adult must stay with you for at least 24 hours after you leave the hospital.   2. Do not drive or use heavy equipment.  If you have weakness or tingling, don't drive or use heavy equipment until this feeling goes away.  3. Do not drink alcohol.  4. Avoid strenuous or risky activities.  Ask for help when climbing stairs.   5. You may feel lightheaded.  IF so, sit for a few minutes before standing.  Have someone help you get up.   6. If you have nausea (feel sick to your stomach): Drink only clear liquids such as apple juice, ginger ale, broth or 7-Up.  Rest may also help.  Be sure to drink enough fluids.  Move to a regular diet as you feel able.  7. You may have a slight fever. Call the doctor if your fever is over 100 F (37.7 C) (taken under the tongue) or lasts longer than 24 hours.  8. You may have a dry mouth, a sore throat, muscle aches or trouble sleeping.  These should go away after 24 hours.  9. Do not make important or legal decisions.   Call your doctor for any of the followin.  Signs of infection (fever, growing tenderness at the surgery site, a large amount of drainage or bleeding, severe pain, foul-smelling drainage, redness, swelling).    2. It has been over 8 to 10 hours since surgery and you are still not able to urinate (pass water).    3.  Headache for over 24 hours.    4.  Numbness, tingling or weakness the day after surgery (if you had spinal anesthesia).  To contact a doctor, call   [phone] or [phone] (Office)

## 2017-02-17 NOTE — ANESTHESIA PROCEDURE NOTES
Peripheral nerve/Neuraxial procedure note : Supraclavicular  Pre-Procedure  Performed by RAYSA GALINDO  Referred by RAYA  Location: pre-op      Pre-Anesthestic Checklist: patient identified, IV checked, site marked, risks and benefits discussed, informed consent, monitors and equipment checked, pre-op evaluation, at physician/surgeon's request and post-op pain management    Timeout  Correct Patient: Yes   Correct Procedure: Yes   Correct Site: Yes   Correct Laterality: Yes   Correct Position: Yes   Site Marked: Yes   .   Procedure Documentation    .    Procedure:    Supraclavicular.     Ultrasound used to identify targeted nerve, plexus, or vascular marker and placed a needle adjacent to it. A permanent image is entered into the patient's record.  Patient Prep;chlorhexidine gluconate and isopropyl alcohol.  Nerve Stim: Initial Level 0.5 mA. Lowest motor response mA..  Needle: (22 G. 80 mm ). . Spinal Needle: . . Insertion Method: Single Shot.     Assessment/Narrative  Paresthesias: Yes and Resolved.  Injection made incrementally with aspirations every 2 mL..  The placement was negative for: painful injection.  Bolus given via..   Secured via.   Complications: none. Comments:  30 mL of 0.5% Ropivacaine injected incrementatlly

## 2017-02-17 NOTE — IP AVS SNAPSHOT
[facility]    [address] [place] [address]    Phone:  [phone]                                       After Visit Summary   2/17/2017    Jose G Pena    MRN: [id number]           After Visit Summary Signature Page     I have received my discharge instructions, and my questions have been answered. I have discussed any challenges I see with this plan with the nurse or doctor.    ..........................................................................................................................................  Patient/Patient Representative Signature      ..........................................................................................................................................  Patient Representative Print Name and Relationship to Patient    ..................................................               ................................................  Date                                            Time    ..........................................................................................................................................  Reviewed by Signature/Title    ...................................................              ..............................................  Date                                                            Time

## 2017-02-18 NOTE — OP NOTE
DATE OF PROCEDURE:  02/17/2017      PREOPERATIVE DIAGNOSIS:   1.  Right rotator cuff tear.   2.  Acromioclavicular arthrosis.   3.  Impingement.      POSTOPERATIVE DIAGNOSES:   1.  Right rotator cuff tear.   2.  Acromioclavicular arthrosis.   3.  Impingement.      PROCEDURES:   1.  Right rotator cuff repair.   2.  Distal clavicle excision.   3.  Acromioplasty with coracoacromial ligament resection.      SURGEON:  Kavin Lambert MD      ASSISTANT:  MIRIAM Sena      INDICATION:  Jose G Pena is a 51-year-old male who fell in December skating sustaining a large rotator cuff tear involving the supraspinatus and infraspinatus.  They felt the upper part of the subscapularis might be torn.  He had acromioclavicular arthrosis and impingement.  He presents now for rotator cuff repair, clavicle excision and acromioplasty.      DESCRIPTION OF PROCEDURE:  After smooth general endotracheal anesthesia and an intrascalene block, the patient was placed in the beach chair position, the right shoulder prepped and draped in sterile fashion.  Pause was performed for patient verification.  A sabre incision was planned.  This was injected with dilute epinephrine and then the incision was made from the mid acromion to the lateral coracoid.  It was carried down through subcutaneous tissue to expose the deltoid fascia.  Fascia was divided 5 cm starting at the AC joint.  It was also released from the anterior acromion and the distal clavicle.  We immediately got fluid coming up from beneath the coracoacromial ligament.  The ligament was identified and resected.  The distal clavicle was then subperiosteally exposed and the distal 1 cm removed with the oscillating saw and the shaft smoothed with a rasp.  Aggressive acromioplasty was then performed with osteotome, rongeur, and rasp.  Once we had adequately decompressed we examined the rotator cuff.  There was not much bursa present except more distally.  The rotator cuff had a huge  tear with all of the supraspinatus and infraspinatus torn and extending along the division between the infraspinatus and teres minor.  The upper part of the subscapularis also appeared torn and the tissue was attaching close to the biceps tendon, but was retracted down leaving a portion of the anterior humerus exposed more anterior from the bicipital groove.  The biceps was showing some erythema at the bicipital groove.  It was staying in position quite well and there was no tearing of the biceps.  After clearing bursa distally several traction sutures were placed in the edge of the rotator cuff which was pulled back to the glenoid.  I freshened the undersurface of the cuff, but it had bunched up considerably and scarred in.  Decorticated the tuberosity adjacent to the articular surface, leaving a small cuff of tissue present.  I then proceeded with placing two 6.5 suture anchors into the tuberosity one just posterior to the biceps and one about 1.5 cm posterior to this.  Both of the suture anchors were fully engaged and then the sutures passed through the rotator cuff in appropriate position.  I was reattaching the rotator interval right over the biceps.  With these 2 anchor points firmly sutured down 1 limb of the Orthocord was used to close posterior from the second suture anchor with the tendon to tendon repair along the infraspinatus, teres minor tear.  The other limb of this was passed through the cuff through the tuberosity and tied laterally.  One limb of the first suture anchor was passed through the cuff through the tuberosity and tied laterally just anterior to the first suture anchor adjacent to the biceps tendon.  I then used #1 Vicryl to close the edge of the rotator cuff and closed the tissue over the biceps tendon and along the anterior portion of the subscap.  I did not find any good tissue to repair to the upper portion of the subscapularis.  At that point, the wound was irrigated.  We checked  motion and the rotator cuff itself was quite tight.  It allowed about 60 degrees external rotation and 60 degrees internal rotation.  The deltoid fascia was then repaired to the trapezial fascia in the anterior acromion with interrupted #1 Vicryl suture.  Side-to-side fiber division of the deltoid was closed with running 0 Vicryl suture.  Subcutaneous tissue closed with interrupted 2-0 Vicryl suture.  Skin edges closed with a running 3-0 Prolene subcuticular closure.  Steri-Strips were applied.  Sterile dressings were applied and the patient was taken to the recovery room in stable condition.         RAYSA DOS SANTOS MD             D: 2017 09:48   T: 2017 20:56   MT: [id number]      Name:     NAM SUTHERLAND   MRN:      [id number]        Account:        [id number]   :      1965           Procedure Date: 2017      Document: [id number]

## 2017-03-02 ENCOUNTER — OFFICE VISIT (OUTPATIENT)
Dept: ORTHOPEDICS | Facility: CLINIC | Age: 52
End: 2017-03-02
Payer: COMMERCIAL

## 2017-03-02 VITALS — HEIGHT: 73 IN | WEIGHT: 202 LBS | BODY MASS INDEX: 26.77 KG/M2 | RESPIRATION RATE: 18 BRPM

## 2017-03-02 DIAGNOSIS — Z98.890 STATUS POST ROTATOR CUFF REPAIR: Primary | ICD-10-CM

## 2017-03-02 PROCEDURE — 99024 POSTOP FOLLOW-UP VISIT: CPT | Performed by: ORTHOPAEDIC SURGERY

## 2017-03-02 NOTE — PROGRESS NOTES
Follow up right rotator cuff repair, Rosales Marsh  on 2/17/17.  He had a large tear  Pain is moderate.   Wound is healing well.  Suture removed.    Assessment:   right rotator cuff repair, Rosales Marsh doing well.  Surgical findings discussed.   Plan:  Will start physical therapy for gentle passive range of motion, advance to active range of motion in 10 days.  Start scar massage with vitamin-E cream.   May stop using the sling or immobilizer in 10 days.  Medication renewal: None # .  Return to clinic 4 weeks to start strengthening.

## 2017-03-02 NOTE — PATIENT INSTRUCTIONS
Will start physical therapy for gentle passive range of motion, advance to active range of motion in  1 week.  Start scar massage with vitamin-E cream.   May stop using the sling or immobilizer in 10 days.  Medication renewal: None # .  Return to clinic 4 weeks to start strengthening.

## 2017-03-02 NOTE — MR AVS SNAPSHOT
After Visit Summary   3/2/2017    Jose G Pena    MRN: [id number]           Patient Information     Date Of Birth          1965        Visit Information        Provider Department      3/2/2017 1:15 PM Kavin Lambert MD [facility]        Today's Diagnoses     Status post rotator cuff repair    -  1      Care Instructions    Will start physical therapy for gentle passive range of motion, advance to active range of motion in  1 week.  Start scar massage with vitamin-E cream.   May stop using the sling or immobilizer in 10 days.  Medication renewal: None # .  Return to clinic 4 weeks to start strengthening.        Follow-ups after your visit        Additional Services     [facility] PT, HAND, AND CHIROPRACTIC REFERRAL       **This order will print in the [facility] Scheduling Office**    Physical Therapy, Hand Therapy and Chiropractic Care are available through:    *[facility]  *[facility]  *[facility]    Call one number to schedule at any of the above locations: [phone].    Your provider has referred you to: Physical Therapy at [facility] or [facility]    Indication/Reason for Referral: SP right rotator cuff repair 2/17/17  Onset of Illness: Date of surgery 2/17/17  Therapy Orders: Evaluate and Treat  Special Programs: None  Special Request: 2 times weekly for 4-6 weeks    Neil Trejo      Additional Comments for the Therapist or Chiropractor: Passive range of motion for 3 weeks post-op and then advance to active range of motion. No strengthening.     Please be aware that coverage of these services is subject to the terms and limitations of your health insurance plan.  Call member services at your health plan with any benefit or coverage questions.      Please bring the following to your appointment:    *Your personal calendar for scheduling future appointments  *Comfortable clothing                  Who to contact     If you have questions  "or need follow up information about today's clinic visit or your schedule please contact [facility] directly at [phone].  Normal or non-critical lab and imaging results will be communicated to you by MyChart, letter or phone within 4 business days after the clinic has received the results. If you do not hear from us within 7 days, please contact the clinic through Teranodehart or phone. If you have a critical or abnormal lab result, we will notify you by phone as soon as possible.  Submit refill requests through MyRealTrip or call your pharmacy and they will forward the refill request to us. Please allow 3 business days for your refill to be completed.          Additional Information About Your Visit        TeranodeharGradient X Information     MyRealTrip lets you send messages to your doctor, view your test results, renew your prescriptions, schedule appointments and more. To sign up, go to [email or web address] . Click on \"Log in\" on the left side of the screen, which will take you to the Welcome page. Then click on \"Sign up Now\" on the right side of the page.     You will be asked to enter the access code listed below, as well as some personal information. Please follow the directions to create your username and password.     Your access code is: [email or web address]  Expires: 3/6/2017 10:27 AM     Your access code will  in 90 days. If you need help or a new code, please call your [place] clinic or [phone].        Care EveryWhere ID     This is your Care EveryWhere ID. This could be used by other organizations to access your [place] medical records  [id number]        Your Vitals Were     Respirations Height BMI (Body Mass Index)             18 1.854 m (6' 1\") 26.65 kg/m2          Blood Pressure from Last 3 Encounters:   17 115/68   17 (!) 150/94   16 158/86    Weight from Last 3 Encounters:   17 91.6 kg (202 lb)   17 92.1 kg (203 lb)   17 98.9 kg (218 lb)              We " Performed the Following     [facility] PT, HAND, AND CHIROPRACTIC REFERRAL        Primary Care Provider    None       No address on file        Thank you!     Thank you for choosing [facility] [place]  for your care. Our goal is always to provide you with excellent care. Hearing back from our patients is one way we can continue to improve our services. Please take a few minutes to complete the written survey that you may receive in the mail after your visit with us. Thank you!             Your Updated Medication List - Protect others around you: Learn how to safely use, store and throw away your medicines at www.disposemymeds.org.          This list is accurate as of: 3/2/17  1:45 PM.  Always use your most recent med list.                   Brand Name Dispense Instructions for use    hydrOXYzine 25 MG capsule    VISTARIL    30 capsule    Take 1 capsule (25 mg) by mouth 3 times daily as needed (spasm)       oxyCODONE-acetaminophen 5-325 MG per tablet    PERCOCET    60 tablet    Take 1-2 tablets by mouth every 4 hours as needed for pain (moderate to severe)

## 2017-03-02 NOTE — NURSING NOTE
"Chief Complaint   Patient presents with     RECHECK     Right RCR on 2/17/17.       Initial Resp 18  Ht 1.854 m (6' 1\")  Wt 91.6 kg (202 lb)  BMI 26.65 kg/m2 Estimated body mass index is 26.65 kg/(m^2) as calculated from the following:    Height as of this encounter: 1.854 m (6' 1\").    Weight as of this encounter: 91.6 kg (202 lb).  Medication Reconciliation: complete   Thu Humphrey MA      "

## 2017-03-06 ENCOUNTER — THERAPY VISIT (OUTPATIENT)
Dept: PHYSICAL THERAPY | Facility: CLINIC | Age: 52
End: 2017-03-06
Payer: COMMERCIAL

## 2017-03-06 DIAGNOSIS — Z98.890 S/P ROTATOR CUFF REPAIR: ICD-10-CM

## 2017-03-06 DIAGNOSIS — M25.511 CHRONIC RIGHT SHOULDER PAIN: Primary | ICD-10-CM

## 2017-03-06 DIAGNOSIS — G89.29 CHRONIC RIGHT SHOULDER PAIN: Primary | ICD-10-CM

## 2017-03-06 PROCEDURE — 97110 THERAPEUTIC EXERCISES: CPT | Mod: GP | Performed by: PHYSICAL THERAPIST

## 2017-03-06 PROCEDURE — 97140 MANUAL THERAPY 1/> REGIONS: CPT | Mod: GP | Performed by: PHYSICAL THERAPIST

## 2017-03-06 PROCEDURE — 97161 PT EVAL LOW COMPLEX 20 MIN: CPT | Mod: GP | Performed by: PHYSICAL THERAPIST

## 2017-03-06 NOTE — MR AVS SNAPSHOT
"              After Visit Summary   3/6/2017    Jose G Pena    MRN: [id number]           Patient Information     Date Of Birth          1965        Visit Information        Provider Department      3/6/2017 10:00 AM Deacon Guillermo, PT [facility]        Today's Diagnoses     Chronic right shoulder pain    -  1    S/P rotator cuff repair           Follow-ups after your visit        Your next 10 appointments already scheduled     Mar 09, 2017  1:50 PM CST   [facility]ON Extremity with Magdi Alanis, PTA   [facility] ([facility])    [address]  [address]  [place] [id number] [phone]              Who to contact     If you have questions or need follow up information about today's clinic visit or your schedule please contact [place] [facility] directly at [phone].  Normal or non-critical lab and imaging results will be communicated to you by MyChart, letter or phone within 4 business days after the clinic has received the results. If you do not hear from us within 7 days, please contact the clinic through [employer] or phone. If you have a critical or abnormal lab result, we will notify you by phone as soon as possible.  Submit refill requests through [employer] or call your pharmacy and they will forward the refill request to us. Please allow 3 business days for your refill to be completed.          Additional Information About Your Visit        MyChart Information     [employer] lets you send messages to your doctor, view your test results, renew your prescriptions, schedule appointments and more. To sign up, go to [email or web address] . Click on \"Log in\" on the left side of the screen, which will take you to the Welcome page. Then click on \"Sign up Now\" on the right side of the page.     You will be asked to enter the access code listed below, as well as some personal information. Please follow the directions " to create your username and password.     Your access code is: [email or web address]  Expires: 2017 12:14 PM     Your access code will  in 90 days. If you need help or a new code, please call your [place] clinic or [phone].        Care EveryWhere ID     This is your Care EveryWhere ID. This could be used by other organizations to access your [place] medical records  [id number]         Blood Pressure from Last 3 Encounters:   17 115/68   17 (!) 150/94   16 158/86    Weight from Last 3 Encounters:   17 91.6 kg (202 lb)   17 92.1 kg (203 lb)   17 98.9 kg (218 lb)              We Performed the Following     HC PT EVAL, LOW COMPLEXITY     SAMPSON INITIAL EVAL REPORT     MANUAL THER TECH,1+REGIONS,EA 15 MIN     THERAPEUTIC EXERCISES        Primary Care Provider    None       No address on file        Thank you!     Thank you for choosing [facility]  for your care. Our goal is always to provide you with excellent care. Hearing back from our patients is one way we can continue to improve our services. Please take a few minutes to complete the written survey that you may receive in the mail after your visit with us. Thank you!             Your Updated Medication List - Protect others around you: Learn how to safely use, store and throw away your medicines at www.disposemymeds.org.          This list is accurate as of: 3/6/17 12:14 PM.  Always use your most recent med list.                   Brand Name Dispense Instructions for use    hydrOXYzine 25 MG capsule    VISTARIL    30 capsule    Take 1 capsule (25 mg) by mouth 3 times daily as needed (spasm)       oxyCODONE-acetaminophen 5-325 MG per tablet    PERCOCET    60 tablet    Take 1-2 tablets by mouth every 4 hours as needed for pain (moderate to severe)

## 2017-03-06 NOTE — PROGRESS NOTES
[facility] Initial Evaluation    Subjective:    Jose G Pena is a 51 year old male with a right shoulder condition.  Condition occurred with:  Unknown cause.  Condition occurred: for unknown reasons.  This is a chronic condition  Fell playing Hockey on 12/4/166. Rotator cuff repair on 2/17/17.    Patient reports pain:  Anterior and lateral.    Pain is described as aching and is intermittent and reported as 2/10.  Associated symptoms:  Loss of motion/stiffness and loss of strength.   Symptoms are exacerbated by other (any arm movement or too much activity) and relieved by ice and NSAID's.  Since onset symptoms are gradually improving.  Special tests:  MRI (see chart).      General health as reported by patient is good.  Pertinent medical history includes:  None.  Medical allergies: no.  Other surgeries include:  None reported.  Medication history: advil.  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    Primary job tasks include:  Prolonged sitting and repetitive tasks.    Barriers include:  None as reported by the patient.    Red flags:  None as reported by the patient.    Per MD order: start AAROM after 10 days (from previous visit) - on/around 3/12/17.     Patient's goals are to improve shoulder ROM and function. Specifically, his reaching was very limited before surgery. 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                  Objective:    Standing Alignment:      Shoulder/UE:  Rounded shoulders (anterior shoulder incision - apparent open procedure, steri-strips in place)                                       Shoulder Evaluation:  ROM:  AROM:    Flexion:  Left:  150        Abduction:  Left: 130         External Rotation:  Left:  60                Extension/Internal Rotation:  Left:  T10        PROM:    Flexion:  Left:  160    Right: 10      Abduction:  Left:  170    Right:  5    Internal Rotation:  Left:  65     Right:  45  External Rotation:  Left:  90    Right:  -15                  Endfeel: empty all directions; very guarded  Strength:  not assessed (d/t post-surgical status; uninvolved extremity strength WNL)                        Special Tests:  not assessed      Palpation:  not assessed                                         General     ROS    Assessment/Plan:      Patient is a 51 year old male with right side shoulder complaints.    Patient has the following significant findings with corresponding treatment plan.                Diagnosis 1:  Shoulder pain, decreased ROM and strength s/p rotator cuff repair    Pain -  hot/cold therapy, manual therapy, education and home program  Decreased ROM/flexibility - manual therapy, therapeutic exercise and home program  Decreased strength - therapeutic exercise, therapeutic activities and home program  Impaired muscle performance - neuro re-education and home program  Impaired posture - neuro re-education and home program    Therapy Evaluation Codes:   1) History comprised of:   Personal factors that impact the plan of care:      Anxiety and Past/current experiences.    Comorbidity factors that impact the plan of care are:      None.     Medications impacting care: None.  2) Examination of Body Systems comprised of:   Body structures and functions that impact the plan of care:      Shoulder.   Activity limitations that impact the plan of care are:      Bathing, Driving, Dressing, Lifting and Laying down.  3) Clinical presentation characteristics are:   Stable/Uncomplicated.  4) Decision-Making    Low complexity using standardized patient assessment instrument and/or measureable assessment of functional outcome.  Cumulative Therapy Evaluation is: Low complexity.    Previous and current functional limitations:  (See Goal Flow Sheet for this information)    Short term and Long term goals: (See Goal Flow Sheet for this information)     Communication ability:  Patient appears to  be able to clearly communicate and understand verbal and written communication and follow directions correctly.  Treatment Explanation - The following has been discussed with the patient:   RX ordered/plan of care  Anticipated outcomes  Possible risks and side effects  This patient would benefit from PT intervention to resume normal activities.   Rehab potential is good.    Frequency:  2 X week, once daily  Duration:  for 4-6 weeks tapering to 1 X a week over 6 weeks  Patient reports he has an upcoming vacation and will miss 1 week of PT. We will resume when he returns.  Discharge Plan:  Achieve all LTG.  Independent in home treatment program.  Reach maximal therapeutic benefit.    Please refer to the daily flowsheet for treatment today, total treatment time and time spent performing 1:1 timed codes.

## 2017-03-09 ENCOUNTER — THERAPY VISIT (OUTPATIENT)
Dept: PHYSICAL THERAPY | Facility: CLINIC | Age: 52
End: 2017-03-09
Payer: COMMERCIAL

## 2017-03-09 DIAGNOSIS — Z98.890 S/P ROTATOR CUFF REPAIR: ICD-10-CM

## 2017-03-09 DIAGNOSIS — G89.29 CHRONIC RIGHT SHOULDER PAIN: ICD-10-CM

## 2017-03-09 DIAGNOSIS — M25.511 CHRONIC RIGHT SHOULDER PAIN: ICD-10-CM

## 2017-03-09 PROCEDURE — 97140 MANUAL THERAPY 1/> REGIONS: CPT | Mod: GP | Performed by: PHYSICAL THERAPY ASSISTANT

## 2017-03-09 PROCEDURE — 97110 THERAPEUTIC EXERCISES: CPT | Mod: GP | Performed by: PHYSICAL THERAPY ASSISTANT

## 2017-03-20 ENCOUNTER — THERAPY VISIT (OUTPATIENT)
Dept: PHYSICAL THERAPY | Facility: CLINIC | Age: 52
End: 2017-03-20
Payer: COMMERCIAL

## 2017-03-20 DIAGNOSIS — M25.511 CHRONIC RIGHT SHOULDER PAIN: ICD-10-CM

## 2017-03-20 DIAGNOSIS — Z98.890 S/P ROTATOR CUFF REPAIR: ICD-10-CM

## 2017-03-20 DIAGNOSIS — G89.29 CHRONIC RIGHT SHOULDER PAIN: ICD-10-CM

## 2017-03-20 PROCEDURE — 97110 THERAPEUTIC EXERCISES: CPT | Mod: GP | Performed by: PHYSICAL THERAPY ASSISTANT

## 2017-03-20 NOTE — PROGRESS NOTES
Subjective:    HPI                    Objective:    System    Physical Exam    General     ROS    Assessment/Plan:      SUBJECTIVE  Subjective changes as noted by pt:  Pt was on vacation and the right arm was good regarding pain level, (drove 20+ hours each way).    Current pain level: 2-3/10    Changes in function:  None     Adverse reaction to treatment or activity:  None    OBJECTIVE  Changes in objective findings: pt is 4 weeks post-op. HEP: added AAROM today (wand and pulleys).  AAROM: right shoulder flexion 60, abd 60 degrees.      ASSESSMENT  Jose G continues to require intervention to meet STG and LTG's: PT  Patient is progressing as expected.  Response to therapy has shown an improvement in  ROM   Progress made towards STG/LTG?  None    PLAN  Continue current treatment plan until patient demonstrates readiness to progress to higher level exercises.    PTA/ATC plan:  Will continue with present plan of care.    Please refer to the daily flowsheet for treatment today, total treatment time and time spent performing 1:1 timed codes.

## 2017-03-20 NOTE — MR AVS SNAPSHOT
"              After Visit Summary   3/20/2017    Jose G Pena    MRN: [id number]           Patient Information     Date Of Birth          1965        Visit Information        Provider Department      3/20/2017 11:30 AM Magdi Alanis PTA [place] [facility]        Today's Diagnoses     Chronic right shoulder pain        S/P rotator cuff repair           Follow-ups after your visit        Your next 10 appointments already scheduled     Mar 23, 2017 11:20 AM CDT   [facility] Extremity with Magdi Alanis PTA   [place] [facility] ([facility])    [address]  [address]  [place] [id number] [phone]              Who to contact     If you have questions or need follow up information about today's clinic visit or your schedule please contact [facility] [place] directly at [phone].  Normal or non-critical lab and imaging results will be communicated to you by MyChart, letter or phone within 4 business days after the clinic has received the results. If you do not hear from us within 7 days, please contact the clinic through [employer] or phone. If you have a critical or abnormal lab result, we will notify you by phone as soon as possible.  Submit refill requests through [employer] or call your pharmacy and they will forward the refill request to us. Please allow 3 business days for your refill to be completed.          Additional Information About Your Visit        MyChart Information     [employer] lets you send messages to your doctor, view your test results, renew your prescriptions, schedule appointments and more. To sign up, go to [email or web address] . Click on \"Log in\" on the left side of the screen, which will take you to the Welcome page. Then click on \"Sign up Now\" on the right side of the page.     You will be asked to enter the access code listed below, as well as some personal information. Please follow the " directions to create your username and password.     Your access code is: [email or web address]  Expires: 2017  1:14 PM     Your access code will  in 90 days. If you need help or a new code, please call your [place] clinic or [phone].        Care EveryWhere ID     This is your Care EveryWhere ID. This could be used by other organizations to access your [place] medical records  [id number]         Blood Pressure from Last 3 Encounters:   17 115/68   17 (!) 150/94   16 158/86    Weight from Last 3 Encounters:   17 91.6 kg (202 lb)   17 92.1 kg (203 lb)   17 98.9 kg (218 lb)              We Performed the Following     Therapeutic Exercises        Primary Care Provider    None       No address on file        Thank you!     Thank you for choosing [place] [facility]   for your care. Our goal is always to provide you with excellent care. Hearing back from our patients is one way we can continue to improve our services. Please take a few minutes to complete the written survey that you may receive in the mail after your visit with us. Thank you!             Your Updated Medication List - Protect others around you: Learn how to safely use, store and throw away your medicines at www.disposemymeds.org.          This list is accurate as of: 3/20/17 12:11 PM.  Always use your most recent med list.                   Brand Name Dispense Instructions for use    hydrOXYzine 25 MG capsule    VISTARIL    30 capsule    Take 1 capsule (25 mg) by mouth 3 times daily as needed (spasm)       oxyCODONE-acetaminophen 5-325 MG per tablet    PERCOCET    60 tablet    Take 1-2 tablets by mouth every 4 hours as needed for pain (moderate to severe)

## 2017-03-23 ENCOUNTER — THERAPY VISIT (OUTPATIENT)
Dept: PHYSICAL THERAPY | Facility: CLINIC | Age: 52
End: 2017-03-23
Payer: COMMERCIAL

## 2017-03-23 DIAGNOSIS — G89.29 CHRONIC RIGHT SHOULDER PAIN: ICD-10-CM

## 2017-03-23 DIAGNOSIS — Z98.890 S/P ROTATOR CUFF REPAIR: ICD-10-CM

## 2017-03-23 DIAGNOSIS — M25.511 CHRONIC RIGHT SHOULDER PAIN: ICD-10-CM

## 2017-03-23 PROCEDURE — 97110 THERAPEUTIC EXERCISES: CPT | Mod: GP | Performed by: PHYSICAL THERAPY ASSISTANT

## 2017-03-23 PROCEDURE — 97140 MANUAL THERAPY 1/> REGIONS: CPT | Mod: GP | Performed by: PHYSICAL THERAPY ASSISTANT

## 2017-03-31 ENCOUNTER — THERAPY VISIT (OUTPATIENT)
Dept: PHYSICAL THERAPY | Facility: CLINIC | Age: 52
End: 2017-03-31
Payer: COMMERCIAL

## 2017-03-31 DIAGNOSIS — G89.29 CHRONIC RIGHT SHOULDER PAIN: ICD-10-CM

## 2017-03-31 DIAGNOSIS — M25.511 CHRONIC RIGHT SHOULDER PAIN: ICD-10-CM

## 2017-03-31 DIAGNOSIS — Z98.890 S/P ROTATOR CUFF REPAIR: ICD-10-CM

## 2017-03-31 PROCEDURE — 97110 THERAPEUTIC EXERCISES: CPT | Mod: GP | Performed by: PHYSICAL THERAPY ASSISTANT

## 2017-03-31 PROCEDURE — 97140 MANUAL THERAPY 1/> REGIONS: CPT | Mod: GP | Performed by: PHYSICAL THERAPY ASSISTANT

## 2017-03-31 NOTE — PROGRESS NOTES
Subjective:    HPI                    Objective:    System    Physical Exam    General     ROS    Assessment/Plan:      SUBJECTIVE  Subjective changes as noted by pt:  Pt feels no pain in the right arm. He wonders when he will get more of functional range back?    Current pain level: 0/10   Changes in function:  Yes (See Goal flowsheet attached for changes in current functional level)     Adverse reaction to treatment or activity:  None    OBJECTIVE  Changes in objective findings:  Pt is 6 wks post-op. PROM: right shoulder flexion 99, abduction 70,   ER 25 degrees.      ASSESSMENT  Jose G continues to require intervention to meet STG and LTG's: PT  Patient is not progressing as expected with his PROM in right shoulder. Pt was on spring break (early part of March) and also was not able to be consistent with HEP during that time.  Response to therapy has shown lack of progress in  pain level  Progress made towards STG/LTG?  Yes (See Goal flowsheet attached for updates on achievement of STG and LTG)    PLAN  Continue current treatment until MD allows progression of the treatment plan.    PTA/ATC plan:  Will continue with present plan of care.    Please refer to the daily flowsheet for treatment today, total treatment time and time spent performing 1:1 timed codes.

## 2017-03-31 NOTE — MR AVS SNAPSHOT
"              After Visit Summary   3/31/2017    Jose G Pena    MRN: [id number]           Patient Information     Date Of Birth          1965        Visit Information        Provider Department      3/31/2017 11:40 AM Magdi Alanis PTA [place] [facility] [place]        Today's Diagnoses     Chronic right shoulder pain        S/P rotator cuff repair           Follow-ups after your visit        Who to contact     If you have questions or need follow up information about today's clinic visit or your schedule please contact [place] [facility] [place] directly at [phone].  Normal or non-critical lab and imaging results will be communicated to you by [employer], letter or phone within 4 business days after the clinic has received the results. If you do not hear from us within 7 days, please contact the clinic through [employer] or phone. If you have a critical or abnormal lab result, we will notify you by phone as soon as possible.  Submit refill requests through [email or web address] or call your pharmacy and they will forward the refill request to us. Please allow 3 business days for your refill to be completed.          Additional Information About Your Visit        MyChart Information     [email or web address] lets you send messages to your doctor, view your test results, renew your prescriptions, schedule appointments and more. To sign up, go to [email or web address] . Click on \"Log in\" on the left side of the screen, which will take you to the Welcome page. Then click on \"Sign up Now\" on the right side of the page.     You will be asked to enter the access code listed below, as well as some personal information. Please follow the directions to create your username and password.     Your access code is: [email or web address]  Expires: 2017  1:14 PM     Your access code will  in 90 days. If you need help or a new code, please call your [place] clinic or [phone].        Care EveryWhere ID     This " is your Care EveryWhere ID. This could be used by other organizations to access your [place] medical records  [id number]         Blood Pressure from Last 3 Encounters:   02/17/17 115/68   02/13/17 (!) 150/94   12/06/16 158/86    Weight from Last 3 Encounters:   03/02/17 91.6 kg (202 lb)   02/13/17 92.1 kg (203 lb)   01/05/17 98.9 kg (218 lb)              We Performed the Following     Manual Ther Tech, 1+Regions, EA 15 min     Therapeutic Exercises        Primary Care Provider    None       No address on file        Thank you!     Thank you for choosing [place] [facility] [place]  for your care. Our goal is always to provide you with excellent care. Hearing back from our patients is one way we can continue to improve our services. Please take a few minutes to complete the written survey that you may receive in the mail after your visit with us. Thank you!             Your Updated Medication List - Protect others around you: Learn how to safely use, store and throw away your medicines at www.disposemymeds.org.          This list is accurate as of: 3/31/17 12:23 PM.  Always use your most recent med list.                   Brand Name Dispense Instructions for use    hydrOXYzine 25 MG capsule    VISTARIL    30 capsule    Take 1 capsule (25 mg) by mouth 3 times daily as needed (spasm)       oxyCODONE-acetaminophen 5-325 MG per tablet    PERCOCET    60 tablet    Take 1-2 tablets by mouth every 4 hours as needed for pain (moderate to severe)

## 2017-04-07 ENCOUNTER — THERAPY VISIT (OUTPATIENT)
Dept: PHYSICAL THERAPY | Facility: CLINIC | Age: 52
End: 2017-04-07
Payer: COMMERCIAL

## 2017-04-07 DIAGNOSIS — G89.29 CHRONIC RIGHT SHOULDER PAIN: ICD-10-CM

## 2017-04-07 DIAGNOSIS — M25.511 CHRONIC RIGHT SHOULDER PAIN: ICD-10-CM

## 2017-04-07 DIAGNOSIS — Z98.890 S/P ROTATOR CUFF REPAIR: ICD-10-CM

## 2017-04-07 PROCEDURE — 97140 MANUAL THERAPY 1/> REGIONS: CPT | Mod: GP | Performed by: PHYSICAL THERAPY ASSISTANT

## 2017-04-07 PROCEDURE — 97110 THERAPEUTIC EXERCISES: CPT | Mod: GP | Performed by: PHYSICAL THERAPY ASSISTANT

## 2017-04-07 NOTE — MR AVS SNAPSHOT
"              After Visit Summary   2017    Jose G Pena    MRN: [id number]           Patient Information     Date Of Birth          1965        Visit Information        Provider Department      2017 11:40 AM Magdi Alanis PTA [place] [facility] [place]        Today's Diagnoses     Chronic right shoulder pain        S/P rotator cuff repair           Follow-ups after your visit        Who to contact     If you have questions or need follow up information about today's clinic visit or your schedule please contact [place] [facility] [place] directly at [phone].  Normal or non-critical lab and imaging results will be communicated to you by [employer], letter or phone within 4 business days after the clinic has received the results. If you do not hear from us within 7 days, please contact the clinic through [employer] or phone. If you have a critical or abnormal lab result, we will notify you by phone as soon as possible.  Submit refill requests through [employer] or call your pharmacy and they will forward the refill request to us. Please allow 3 business days for your refill to be completed.          Additional Information About Your Visit        MyChart Information     [employer] lets you send messages to your doctor, view your test results, renew your prescriptions, schedule appointments and more. To sign up, go to [email or web address] . Click on \"Log in\" on the left side of the screen, which will take you to the Welcome page. Then click on \"Sign up Now\" on the right side of the page.     You will be asked to enter the access code listed below, as well as some personal information. Please follow the directions to create your username and password.     Your access code is: [email or web address]  Expires: 2017  1:14 PM     Your access code will  in 90 days. If you need help or a new code, please call your [place] clinic or [phone].        Care EveryWhere ID     This is " your Care EveryWhere ID. This could be used by other organizations to access your [place] medical records  [id number]         Blood Pressure from Last 3 Encounters:   02/17/17 115/68   02/13/17 (!) 150/94   12/06/16 158/86    Weight from Last 3 Encounters:   03/02/17 91.6 kg (202 lb)   02/13/17 92.1 kg (203 lb)   01/05/17 98.9 kg (218 lb)              We Performed the Following     Manual Ther Tech, 1+Regions, EA 15 min     Therapeutic Exercises        Primary Care Provider    None       No address on file        Thank you!     Thank you for choosing [place] [facility] [place]  for your care. Our goal is always to provide you with excellent care. Hearing back from our patients is one way we can continue to improve our services. Please take a few minutes to complete the written survey that you may receive in the mail after your visit with us. Thank you!             Your Updated Medication List - Protect others around you: Learn how to safely use, store and throw away your medicines at www.disposemymeds.org.          This list is accurate as of: 4/7/17  1:47 PM.  Always use your most recent med list.                   Brand Name Dispense Instructions for use    hydrOXYzine 25 MG capsule    VISTARIL    30 capsule    Take 1 capsule (25 mg) by mouth 3 times daily as needed (spasm)       oxyCODONE-acetaminophen 5-325 MG per tablet    PERCOCET    60 tablet    Take 1-2 tablets by mouth every 4 hours as needed for pain (moderate to severe)

## 2017-04-07 NOTE — PROGRESS NOTES
Subjective:    HPI                    Objective:    System    Physical Exam    General     ROS    Assessment/Plan:      SUBJECTIVE  Subjective changes as noted by pt:  Pt states the right arm is very tight in the AM hours. He does not really ice now.    Current pain level: 0/10   Changes in function:  None     Adverse reaction to treatment or activity:  None    OBJECTIVE  Changes in objective findings:  Pt is 7 weeks post-op today. PROM: right shoulder flexion 110, abd 75, ER 25.  End of session flexion 120.     ASSESSMENT  Jose G continues to require intervention to meet STG and LTG's: PT  Patient is not progressing as expected.  Response to therapy has shown lack of progress in  ROM   Progress made towards STG/LTG?  None    PLAN  Continue current treatment plan until patient demonstrates readiness to progress to higher level exercises.    PTA/ATC plan:  Will continue with present plan of care. Pt was told to be doing more of his HEP and to ice at the end of a long day at work/driving.     Please refer to the daily flowsheet for treatment today, total treatment time and time spent performing 1:1 timed codes.

## 2017-04-10 ENCOUNTER — THERAPY VISIT (OUTPATIENT)
Dept: PHYSICAL THERAPY | Facility: CLINIC | Age: 52
End: 2017-04-10
Payer: COMMERCIAL

## 2017-04-10 DIAGNOSIS — M25.511 CHRONIC RIGHT SHOULDER PAIN: ICD-10-CM

## 2017-04-10 DIAGNOSIS — Z98.890 S/P ROTATOR CUFF REPAIR: ICD-10-CM

## 2017-04-10 DIAGNOSIS — G89.29 CHRONIC RIGHT SHOULDER PAIN: ICD-10-CM

## 2017-04-10 PROCEDURE — 97140 MANUAL THERAPY 1/> REGIONS: CPT | Mod: GP | Performed by: PHYSICAL THERAPY ASSISTANT

## 2017-04-10 PROCEDURE — 97110 THERAPEUTIC EXERCISES: CPT | Mod: GP | Performed by: PHYSICAL THERAPY ASSISTANT

## 2017-04-10 NOTE — MR AVS SNAPSHOT
"              After Visit Summary   4/10/2017    Jose G Pena    MRN: [id number]           Patient Information     Date Of Birth          1965        Visit Information        Provider Department      4/10/2017 11:30 AM Magdi Alanis PTA [facility]        Today's Diagnoses     Chronic right shoulder pain        S/P rotator cuff repair           Follow-ups after your visit        Who to contact     If you have questions or need follow up information about today's clinic visit or your schedule please contact [place] [facility] directly at [phone].  Normal or non-critical lab and imaging results will be communicated to you by Yasmohart, letter or phone within 4 business days after the clinic has received the results. If you do not hear from us within 7 days, please contact the clinic through Yasmohart or phone. If you have a critical or abnormal lab result, we will notify you by phone as soon as possible.  Submit refill requests through Ardelyx or call your pharmacy and they will forward the refill request to us. Please allow 3 business days for your refill to be completed.          Additional Information About Your Visit        MyChart Information     Ardelyx lets you send messages to your doctor, view your test results, renew your prescriptions, schedule appointments and more. To sign up, go to [email or web address] . Click on \"Log in\" on the left side of the screen, which will take you to the Welcome page. Then click on \"Sign up Now\" on the right side of the page.     You will be asked to enter the access code listed below, as well as some personal information. Please follow the directions to create your username and password.     Your access code is: [email or web address]  Expires: 2017  1:14 PM     Your access code will  in 90 days. If you need help or a new code, please call your [place] clinic or [phone].        Care EveryWhere ID     This " is your Care EveryWhere ID. This could be used by other organizations to access your [place] medical records  [id number]         Blood Pressure from Last 3 Encounters:   02/17/17 115/68   02/13/17 (!) 150/94   12/06/16 158/86    Weight from Last 3 Encounters:   03/02/17 91.6 kg (202 lb)   02/13/17 92.1 kg (203 lb)   01/05/17 98.9 kg (218 lb)              We Performed the Following     Manual Ther Tech, 1+Regions, EA 15 min     Therapeutic Exercises        Primary Care Provider    None       No address on file        Thank you!     Thank you for choosing [place] [facility]  for your care. Our goal is always to provide you with excellent care. Hearing back from our patients is one way we can continue to improve our services. Please take a few minutes to complete the written survey that you may receive in the mail after your visit with us. Thank you!             Your Updated Medication List - Protect others around you: Learn how to safely use, store and throw away your medicines at www.disposemymeds.org.          This list is accurate as of: 4/10/17 12:08 PM.  Always use your most recent med list.                   Brand Name Dispense Instructions for use    hydrOXYzine 25 MG capsule    VISTARIL    30 capsule    Take 1 capsule (25 mg) by mouth 3 times daily as needed (spasm)       oxyCODONE-acetaminophen 5-325 MG per tablet    PERCOCET    60 tablet    Take 1-2 tablets by mouth every 4 hours as needed for pain (moderate to severe)

## 2017-04-20 ENCOUNTER — THERAPY VISIT (OUTPATIENT)
Dept: PHYSICAL THERAPY | Facility: CLINIC | Age: 52
End: 2017-04-20
Payer: COMMERCIAL

## 2017-04-20 DIAGNOSIS — Z98.890 S/P ROTATOR CUFF REPAIR: ICD-10-CM

## 2017-04-20 DIAGNOSIS — M25.511 CHRONIC RIGHT SHOULDER PAIN: ICD-10-CM

## 2017-04-20 DIAGNOSIS — G89.29 CHRONIC RIGHT SHOULDER PAIN: ICD-10-CM

## 2017-04-20 PROCEDURE — 97140 MANUAL THERAPY 1/> REGIONS: CPT | Mod: GP | Performed by: PHYSICAL THERAPY ASSISTANT

## 2017-04-20 PROCEDURE — 97110 THERAPEUTIC EXERCISES: CPT | Mod: GP | Performed by: PHYSICAL THERAPY ASSISTANT

## 2017-04-20 NOTE — PROGRESS NOTES
Subjective:    HPI                    Objective:    System    Physical Exam    General     ROS    Assessment/Plan:      SUBJECTIVE  Subjective changes as noted by pt:  Pt is busy with traveling out of town for work.  He is doing his exercises when he can.  Current pain level:  1/10   Changes in function:  None     Adverse reaction to treatment or activity:  None    OBJECTIVE  Changes in objective findings: Pt is 8+ wks post-op. PROM: right shoulder flexion 130, AAROM 119. PROM abduction 120, ER 30 degrees.      ASSESSMENT  Jose G continues to require intervention to meet STG and LTG's: PT  Patient is not progressing as expected.  Response to therapy has shown lack of progress in  ROM   Progress made towards STG/LTG?  None    PLAN  Continue current treatment plan until patient demonstrates readiness to progress to higher level exercises.    PTA/ATC plan:  Will continue with present plan of care.    Please refer to the daily flowsheet for treatment today, total treatment time and time spent performing 1:1 timed codes.

## 2017-04-20 NOTE — MR AVS SNAPSHOT
"              After Visit Summary   2017    Jose G Pena    MRN: [id number]           Patient Information     Date Of Birth          1965        Visit Information        Provider Department      2017 11:20 AM Magdi Alanis PTA [facility] [facility]        Today's Diagnoses     Chronic right shoulder pain        S/P rotator cuff repair           Follow-ups after your visit        Who to contact     If you have questions or need follow up information about today's clinic visit or your schedule please contact [place] [facility] directly at [phone].  Normal or non-critical lab and imaging results will be communicated to you by VMO Systemshart, letter or phone within 4 business days after the clinic has received the results. If you do not hear from us within 7 days, please contact the clinic through VMO Systemshart or phone. If you have a critical or abnormal lab result, we will notify you by phone as soon as possible.  Submit refill requests through Stockleap or call your pharmacy and they will forward the refill request to us. Please allow 3 business days for your refill to be completed.          Additional Information About Your Visit        MyChart Information     Stockleap lets you send messages to your doctor, view your test results, renew your prescriptions, schedule appointments and more. To sign up, go to [email or web address] . Click on \"Log in\" on the left side of the screen, which will take you to the Welcome page. Then click on \"Sign up Now\" on the right side of the page.     You will be asked to enter the access code listed below, as well as some personal information. Please follow the directions to create your username and password.     Your access code is: [email or web address]  Expires: 2017  1:14 PM     Your access code will  in 90 days. If you need help or a new code, please call your [place] clinic or [phone].        Care EveryWhere ID     This " is your Care EveryWhere ID. This could be used by other organizations to access your [place] medical records  [id number]         Blood Pressure from Last 3 Encounters:   02/17/17 115/68   02/13/17 (!) 150/94   12/06/16 158/86    Weight from Last 3 Encounters:   03/02/17 91.6 kg (202 lb)   02/13/17 92.1 kg (203 lb)   01/05/17 98.9 kg (218 lb)              We Performed the Following     Manual Ther Tech, 1+Regions, EA 15 min     Therapeutic Exercises        Primary Care Provider    None       No address on file        Thank you!     Thank you for choosing [place] [facility]  for your care. Our goal is always to provide you with excellent care. Hearing back from our patients is one way we can continue to improve our services. Please take a few minutes to complete the written survey that you may receive in the mail after your visit with us. Thank you!             Your Updated Medication List - Protect others around you: Learn how to safely use, store and throw away your medicines at www.disposemymeds.org.          This list is accurate as of: 4/20/17  1:00 PM.  Always use your most recent med list.                   Brand Name Dispense Instructions for use    hydrOXYzine 25 MG capsule    VISTARIL    30 capsule    Take 1 capsule (25 mg) by mouth 3 times daily as needed (spasm)       oxyCODONE-acetaminophen 5-325 MG per tablet    PERCOCET    60 tablet    Take 1-2 tablets by mouth every 4 hours as needed for pain (moderate to severe)

## 2017-06-05 ENCOUNTER — THERAPY VISIT (OUTPATIENT)
Dept: PHYSICAL THERAPY | Facility: CLINIC | Age: 52
End: 2017-06-05
Payer: COMMERCIAL

## 2017-06-05 DIAGNOSIS — M25.511 CHRONIC RIGHT SHOULDER PAIN: ICD-10-CM

## 2017-06-05 DIAGNOSIS — G89.29 CHRONIC RIGHT SHOULDER PAIN: ICD-10-CM

## 2017-06-05 DIAGNOSIS — Z98.890 S/P ROTATOR CUFF REPAIR: ICD-10-CM

## 2017-06-05 PROCEDURE — 97110 THERAPEUTIC EXERCISES: CPT | Mod: GP | Performed by: PHYSICAL THERAPY ASSISTANT

## 2017-06-05 NOTE — MR AVS SNAPSHOT
"              After Visit Summary   2017    Jose G Pena    MRN: [id number]           Patient Information     Date Of Birth          1965        Visit Information        Provider Department      2017 1:20 PM Magdi Alanis PTA [place] [facility]        Today's Diagnoses     Chronic right shoulder pain        S/P rotator cuff repair           Follow-ups after your visit        Who to contact     If you have questions or need follow up information about today's clinic visit or your schedule please contact [place] [facility] directly at [phone].  Normal or non-critical lab and imaging results will be communicated to you by Crypteia Networkshart, letter or phone within 4 business days after the clinic has received the results. If you do not hear from us within 7 days, please contact the clinic through Crypteia Networkshart or phone. If you have a critical or abnormal lab result, we will notify you by phone as soon as possible.  Submit refill requests through Uber or call your pharmacy and they will forward the refill request to us. Please allow 3 business days for your refill to be completed.          Additional Information About Your Visit        MyChart Information     Uber lets you send messages to your doctor, view your test results, renew your prescriptions, schedule appointments and more. To sign up, go to [email or web address] . Click on \"Log in\" on the left side of the screen, which will take you to the Welcome page. Then click on \"Sign up Now\" on the right side of the page.     You will be asked to enter the access code listed below, as well as some personal information. Please follow the directions to create your username and password.     Your access code is: [email or web address]  Expires: 9/3/2017  1:59 PM     Your access code will  in 90 days. If you need help or a new code, please call your [place] clinic or [phone].        Care EveryWhere ID     This is " your Care EveryWhere ID. This could be used by other organizations to access your [place] medical records  [id number]         Blood Pressure from Last 3 Encounters:   02/17/17 115/68   02/13/17 (!) 150/94   12/06/16 158/86    Weight from Last 3 Encounters:   03/02/17 91.6 kg (202 lb)   02/13/17 92.1 kg (203 lb)   01/05/17 98.9 kg (218 lb)              We Performed the Following     Therapeutic Exercises        Primary Care Provider    None       No address on file        Thank you!     Thank you for choosing [place] [facility]  for your care. Our goal is always to provide you with excellent care. Hearing back from our patients is one way we can continue to improve our services. Please take a few minutes to complete the written survey that you may receive in the mail after your visit with us. Thank you!             Your Updated Medication List - Protect others around you: Learn how to safely use, store and throw away your medicines at www.disposemymeds.org.          This list is accurate as of: 6/5/17  1:59 PM.  Always use your most recent med list.                   Brand Name Dispense Instructions for use    hydrOXYzine 25 MG capsule    VISTARIL    30 capsule    Take 1 capsule (25 mg) by mouth 3 times daily as needed (spasm)       oxyCODONE-acetaminophen 5-325 MG per tablet    PERCOCET    60 tablet    Take 1-2 tablets by mouth every 4 hours as needed for pain (moderate to severe)

## 2017-06-05 NOTE — PROGRESS NOTES
Subjective:    HPI                    Objective:    System    Physical Exam    General     ROS    Assessment/Plan:      PROGRESS  REPORT    Progress reporting period is from 3/6/2017 to 6/5/2017.       SUBJECTIVE  Subjective changes noted by patient: Pt feeling 50% better in the R arm. He is washing his head with right arm and no pain. 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     Current pain level is  0/10.     Previous pain level was  5/10.   Changes in function:  Yes (See Goal flowsheet attached for changes in current functional level)  Adverse reaction to treatment or activity: None    OBJECTIVE  Changes noted in objective findings:  pt is 15 weeks post-op AROM: right shoulder flexion 135,   abduction 130, ER 50, IR T10.      ASSESSMENT/PLAN  Updated problem list and treatment plan: Diagnosis 1:  Right RCR   Pain -  hot/cold therapy, manual therapy, self management, education and home program  Decreased ROM/flexibility - manual therapy, therapeutic exercise and home program  Decreased strength - therapeutic exercise, therapeutic activities and home program  Decreased function - therapeutic activities and home program  STG/LTGs have been met or progress has been made towards goals:  Yes (See Goal flow sheet completed today.)  Assessment of Progress: The patient's condition is improving.  Patient is meeting short term goals and is progressing towards long term goals.  Self Management Plans:  Patient has been instructed in a home treatment program.  Patient  has been instructed in self management of symptoms.  I have re-evaluated this patient and find that the nature, scope, duration and intensity of the therapy is appropriate for the medical condition of the patient.  Jose G continues to require the following intervention to meet STG and LTG's:  PT, AROM added today as pt had a gap in his PT sequence.    Recommendations:  Pt travels much out of town for  work. He is going to add 1-2 more PT visits over the next month.    The progress note summary was written in collaboration with and reviewed by the physical therapist.  Pt was told the next PT visit is to be scheduled with his primary PT (Eri Guillermo, PT).     Please refer to the daily flowsheet for treatment today, total treatment time and time spent performing 1:1 timed codes.

## 2017-07-31 ENCOUNTER — THERAPY VISIT (OUTPATIENT)
Dept: PHYSICAL THERAPY | Facility: CLINIC | Age: 52
End: 2017-07-31
Payer: COMMERCIAL

## 2017-07-31 DIAGNOSIS — M25.511 CHRONIC RIGHT SHOULDER PAIN: ICD-10-CM

## 2017-07-31 DIAGNOSIS — G89.29 CHRONIC RIGHT SHOULDER PAIN: ICD-10-CM

## 2017-07-31 DIAGNOSIS — Z98.890 S/P ROTATOR CUFF REPAIR: ICD-10-CM

## 2017-07-31 PROCEDURE — 97112 NEUROMUSCULAR REEDUCATION: CPT | Mod: GP | Performed by: PHYSICAL THERAPIST

## 2017-07-31 PROCEDURE — 97110 THERAPEUTIC EXERCISES: CPT | Mod: GP | Performed by: PHYSICAL THERAPIST

## 2017-07-31 PROCEDURE — 97164 PT RE-EVAL EST PLAN CARE: CPT | Mod: GP | Performed by: PHYSICAL THERAPIST

## 2017-07-31 NOTE — MR AVS SNAPSHOT
"              After Visit Summary   2017    Jose G Pena    MRN: [id number]           Patient Information     Date Of Birth          1965        Visit Information        Provider Department      2017 11:20 AM Deacon Guillermo PT [place] [facility]        Today's Diagnoses     Chronic right shoulder pain        S/P rotator cuff repair           Follow-ups after your visit        Who to contact     If you have questions or need follow up information about today's clinic visit or your schedule please contact [place] [facility] directly at [phone].  Normal or non-critical lab and imaging results will be communicated to you by Elixir Bio-Techhart, letter or phone within 4 business days after the clinic has received the results. If you do not hear from us within 7 days, please contact the clinic through Elixir Bio-Techhart or phone. If you have a critical or abnormal lab result, we will notify you by phone as soon as possible.  Submit refill requests through LawKick or call your pharmacy and they will forward the refill request to us. Please allow 3 business days for your refill to be completed.          Additional Information About Your Visit        MyChart Information     LawKick lets you send messages to your doctor, view your test results, renew your prescriptions, schedule appointments and more. To sign up, go to [email or web address] . Click on \"Log in\" on the left side of the screen, which will take you to the Welcome page. Then click on \"Sign up Now\" on the right side of the page.     You will be asked to enter the access code listed below, as well as some personal information. Please follow the directions to create your username and password.     Your access code is: [email or web address]  Expires: 9/3/2017  1:59 PM     Your access code will  in 90 days. If you need help or a new code, please call your [place] clinic or [phone].        Care EveryWhere ID     This is " your Care EveryWhere ID. This could be used by other organizations to access your [place] medical records  [id number]         Blood Pressure from Last 3 Encounters:   02/17/17 115/68   02/13/17 (!) 150/94   12/06/16 158/86    Weight from Last 3 Encounters:   03/02/17 91.6 kg (202 lb)   02/13/17 92.1 kg (203 lb)   01/05/17 98.9 kg (218 lb)              We Performed the Following     HC PT RE-EVAL     [facility] PROGRESS NOTES REPORT     NEUROMUSCULAR RE-EDUCATION     THERAPEUTIC EXERCISES        Primary Care Provider    None       No address on file        Equal Access to Services     [facility]: Hadii yumiko [place], hebert beasley, qian rebolledo, yola chapin . So [facility] [phone].    ATENCIÓN: Si habla español, tiene a salmeron disposición servicios gratuitos de asistencia lingüística. Llame al [phone].    We comply with applicable federal civil rights laws and [place] laws. We do not discriminate on the basis of race, color, national origin, age, disability sex, sexual orientation or gender identity.            Thank you!     Thank you for choosing [facility]  for your care. Our goal is always to provide you with excellent care. Hearing back from our patients is one way we can continue to improve our services. Please take a few minutes to complete the written survey that you may receive in the mail after your visit with us. Thank you!             Your Updated Medication List - Protect others around you: Learn how to safely use, store and throw away your medicines at www.disposemymeds.org.          This list is accurate as of: 7/31/17 12:01 PM.  Always use your most recent med list.                   Brand Name Dispense Instructions for use Diagnosis    hydrOXYzine 25 MG capsule    VISTARIL    30 capsule    Take 1 capsule (25 mg) by mouth 3 times daily as needed (spasm)    AC (acromioclavicular) joint arthritis, Shoulder impingement,  right, Complete tear of right rotator cuff       oxyCODONE-acetaminophen 5-325 MG per tablet    PERCOCET    60 tablet    Take 1-2 tablets by mouth every 4 hours as needed for pain (moderate to severe)    AC (acromioclavicular) joint arthritis, Shoulder impingement, right, Complete tear of right rotator cuff

## 2017-07-31 NOTE — PROGRESS NOTES
Subjective:    HPI                    Objective:    System    Physical Exam    General     ROS    Assessment/Plan:      PROGRESS  REPORT    Progress reporting period is from 6/5/17 to 7/31/17.       SUBJECTIVE  Subjective changes noted by patient: Patient returns after a nearly 2 month hiatus from PT. He reports that he is not having any pain with usual daily activities nor with work tasks. He has not golfed yet. He has not been back to his surgeon for follow up. He reports that he has continued to do his wand exercises and beginning strengthening as instructed at his last visit - he is doing strengthening about 2 times per week.    Current pain level is 0/10.     Previous pain level was  4/10.   Changes in function:  Yes (See Goal flowsheet attached for changes in current functional level)  Adverse reaction to treatment or activity: None    OBJECTIVE  Changes noted in objective findings: AROM flexion 135, abd 135, ER 60, E/IR T11 vs T9 opposite side. Improved ROM but still limited vs opposite side. MMT L shoulder 5/5 all directions; R shoulder flexion, abd 3+/5, IR 4-/5, ER 4+/5. Scapular stabilizer MMT middle trap 4/5, lower trap 3/5.     ASSESSMENT/PLAN  Updated problem list and treatment plan: Diagnosis 1:  Shoulder pain, decreased ROM and strength s/p RC repair    Decreased ROM/flexibility - manual therapy, therapeutic exercise and home program  Decreased joint mobility - manual therapy, therapeutic exercise and home program  Decreased strength - therapeutic exercise, therapeutic activities and home program  STG/LTGs have been met or progress has been made towards goals:  Yes (See Goal flow sheet completed today.)  Assessment of Progress: The patient's condition has potential to improve.  Self Management Plans:  Patient has been instructed in a home treatment program.  I have re-evaluated this patient and find that the nature, scope, duration and intensity of the therapy is appropriate for the medical condition  of the patient.  Jose G continues to require the following intervention to meet STG and LTG's:  PT    Recommendations:  This patient would benefit from continued therapy.     Frequency:  2 X a month, once daily  Duration:  for 3 months    Patient shows considerable weakness and continued ROM loss. Recommend resuming PT with a greater frequency to address these limitations.           Please refer to the daily flowsheet for treatment today, total treatment time and time spent performing 1:1 timed codes.

## 2017-07-31 NOTE — LETTER
[place] [facility]  [address]  [address]  [place] [address]  [phone]    2017    Re: Jose G Pena   :   1965  MRN:  [id number]   REFERRING PHYSICIAN:   Mark Lopez    [place] [facility]    Date of Initial Evaluation: 3/6/17  Visits:  Rxs Used: 10  Reason for Referral:     Chronic right shoulder pain  S/P rotator cuff repair    EVALUATION SUMMARY  PROGRESS  REPORT    Progress reporting period is from 17 to 17.       SUBJECTIVE  Subjective changes noted by patient: Patient returns after a nearly 2 month hiatus from PT. He reports that he is not having any pain with usual daily activities nor with work tasks. He has not golfed yet. He has not been back to his surgeon for follow up. He reports that he has continued to do his wand exercises and beginning strengthening as instructed at his last visit - he is doing strengthening about 2 times per week.    Current pain level is 0/10.   Previous pain level was  4/10.   Changes in function:  Yes (See Goal flowsheet attached for changes in current functional level)Adverse reaction to treatment or activity: None    OBJECTIVE  Changes noted in objective findings: AROM flexion 135, abd 135, ER 60, E/IR T11 vs T9 opposite side. Improved ROM but still limited vs opposite side. MMT L shoulder 5/5 all directions; R shoulder flexion, abd 3+/5, IR 4-/5, ER 4+/5. Scapular stabilizer MMT middle trap 4/5, lower trap 3/5.     ASSESSMENT/PLAN  Updated problem list and treatment plan: Diagnosis 1:  Shoulder pain, decreased ROM and strength s/p RC repair  Decreased ROM/flexibility - manual therapy, therapeutic exercise and home programDecreased joint mobility - manual therapy, therapeutic exercise and home programDecreased strength - therapeutic exercise, therapeutic activities and home programSTG/LTGs have been met or progress has been made towards goals:  Yes (See Goal flow sheet completed  today.)  Assessment of Progress: The patient's condition has potential to improve.  Self Management Plans:  Patient has been instructed in a home treatment program.  I have re-evaluated this patient and find that the nature, scope, duration and intensity of the therapy is appropriate for the medical condition of the patient.  Jose G continues to require the following intervention to meet STG and LTG's:  PT     Jose G Pena   :   1965        Recommendations:  This patient would benefit from continued therapy.     Frequency:  2 X a month, once daily  Duration:  for 3 months    Patient shows considerable weakness and continued ROM loss. Recommend resuming PT with a greater frequency to address these limitations.               Thank you for your referral.    INQUIRIES  Therapist: Deacon Guillermo, PT, DPT, [facility]  [facility]  [address]  [place] [address]  Phone: [phone]  Fax: [phone]

## 2017-09-12 PROBLEM — Z98.890 S/P ROTATOR CUFF REPAIR: Status: RESOLVED | Noted: 2017-03-06 | Resolved: 2017-09-12

## 2017-09-12 PROBLEM — M25.511 SHOULDER PAIN, RIGHT: Status: RESOLVED | Noted: 2017-03-06 | Resolved: 2017-09-12

## 2017-09-12 NOTE — PROGRESS NOTES
This patient did not return to Physical Therapy to complete their plan of care, thus, full DC status is unknown. Please refer to the Progress note dated 7/31/17 for discharge information.   This bout of care ranged from 3/6/17 to 7/31/17 with variable attendance by the patient.  Discharge patient from PT at this time.

## 2017-09-18 ENCOUNTER — OFFICE VISIT (OUTPATIENT)
Dept: OPHTHALMOLOGY | Facility: CLINIC | Age: 52
End: 2017-09-18
Payer: COMMERCIAL

## 2017-09-18 DIAGNOSIS — H34.8392 BRVO (BRANCH RETINAL VEIN OCCLUSION) (H): Primary | ICD-10-CM

## 2017-09-18 PROCEDURE — 92004 COMPRE OPH EXAM NEW PT 1/>: CPT | Performed by: STUDENT IN AN ORGANIZED HEALTH CARE EDUCATION/TRAINING PROGRAM

## 2017-09-18 ASSESSMENT — REFRACTION_WEARINGRX
OD_ADD: +1.50
OD_SPHERE: -2.00
OD_CYLINDER: +0.25
OS_SPHERE: -2.75
OD_AXIS: 124
OS_CYLINDER: SPHERE
OS_ADD: +1.50
SPECS_TYPE: PAL

## 2017-09-18 ASSESSMENT — VISUAL ACUITY
OS_CC+: -1
OS_CC: 20/30
OD_CC: 20/20
CORRECTION_TYPE: GLASSES
METHOD: SNELLEN - LINEAR

## 2017-09-18 ASSESSMENT — TONOMETRY
IOP_METHOD: APPLANATION
OD_IOP_MMHG: 15
OS_IOP_MMHG: 14

## 2017-09-18 ASSESSMENT — CUP TO DISC RATIO
OD_RATIO: 0.5
OS_RATIO: 0.5

## 2017-09-18 ASSESSMENT — CONF VISUAL FIELD
OD_NORMAL: 1
OS_INFERIOR_NASAL_RESTRICTION: 3

## 2017-09-18 ASSESSMENT — SLIT LAMP EXAM - LIDS
COMMENTS: NORMAL
COMMENTS: NORMAL

## 2017-09-18 NOTE — PROGRESS NOTES
Current Eye Medications: none       Subjective: consult from [facility] for Left eye eval. Was in there on 9-7-17, they told him they do not dilate eyes.  When looking straight ahead, he notices that the lower temporal part of the left eye vision is more blurry. Has been noticing this for 3 weeks.  No flashes, no floaters or pain.      Objective:  See Ophthalmology Exam.       Assessment:  Jose G Pena is a 51 year old male who presents with:   Encounter Diagnoses   Name Primary?     BRVO (branch retinal vein occlusion) - Left Eye Appears to have mild edema into fovea       Plan:  Referral to [facility] in [place]  Advised to have physical with family doctor to assess cardiovascular risk factors    Alma Grajeda MD  [phone]

## 2017-09-18 NOTE — PATIENT INSTRUCTIONS
Referral to [facility] in [place]  Advised to have physical with family doctor    Alma Grajeda MD  [phone]

## 2017-09-18 NOTE — MR AVS SNAPSHOT
After Visit Summary   9/18/2017    Jose G Pena    MRN: [id number]           Patient Information     Date Of Birth          1965        Visit Information        Provider Department      9/18/2017 2:15 PM Alma Grajeda MD [facility]        Today's Diagnoses     BRVO (branch retinal vein occlusion)    -  1    Macular hemorrhage of left eye          Care Instructions    Referral to [facility]  Advised to have physical with family doctor    Alma Grajeda MD  [phone]            Follow-ups after your visit        Additional Services     OPHTHALMOLOGY ADULT REFERRAL       Your provider has referred you to:  [facility]       Please be aware that coverage of these services is subject to the terms and limitations of your health insurance plan.  Call member services at your health plan with any benefit or coverage questions.      Please bring the following to your appointment:  >>   Any x-rays, CTs or MRIs which have been performed.  Contact the facility where they were done to arrange for  prior to your scheduled appointment.  Any new CT, MRI or other procedures ordered by your specialist must be performed at a [place] facility or coordinated by your clinic's referral office.    >>   List of current medications   >>   This referral request   >>   Any documents/labs given to you for this referral                  Follow-up notes from your care team     Return in about 1 year (around 9/18/2018) for Complete Exam.      Who to contact     If you have questions or need follow up information about today's clinic visit or your schedule please contact [facility] directly at [phone].  Normal or non-critical lab and imaging results will be communicated to you by MyChart, letter or phone within 4 business days after the clinic has received the results. If you do not hear from us within 7 days, please contact the clinic through MyChart or phone. If you  "have a critical or abnormal lab result, we will notify you by phone as soon as possible.  Submit refill requests through Prezto or call your pharmacy and they will forward the refill request to us. Please allow 3 business days for your refill to be completed.          Additional Information About Your Visit        Spare Change Paymentshart Information     Prezto lets you send messages to your doctor, view your test results, renew your prescriptions, schedule appointments and more. To sign up, go to [email or web address] . Click on \"Log in\" on the left side of the screen, which will take you to the Welcome page. Then click on \"Sign up Now\" on the right side of the page.     You will be asked to enter the access code listed below, as well as some personal information. Please follow the directions to create your username and password.     Your access code is: [email or web address]  Expires: 2017  3:30 PM     Your access code will  in 90 days. If you need help or a new code, please call your [facility] clinic or [phone].        Care EveryWhere ID     This is your Care EveryWhere ID. This could be used by other organizations to access your [facility] medical records  [id number]         Blood Pressure from Last 3 Encounters:   17 115/68   17 (!) 150/94   16 158/86    Weight from Last 3 Encounters:   17 91.6 kg (202 lb)   17 92.1 kg (203 lb)   17 98.9 kg (218 lb)              We Performed the Following     OPHTHALMOLOGY ADULT REFERRAL        Primary Care Provider Office Phone # Fax #    [facility] [phone]       [address] [place] [address]        Equal Access to Services     CAR WANG : [place], hebert beasley, qian rebolledo, yola sanford. So [facility] [phone].    ATENCIÓN: Si habla español, tiene a salmeron disposición servicios gratuitos de asistencia lingüística. Llame al " [phone].    We comply with applicable federal civil rights laws and [place] laws. We do not discriminate on the basis of race, color, national origin, age, disability sex, sexual orientation or gender identity.            Thank you!     Thank you for choosing [facility]  for your care. Our goal is always to provide you with excellent care. Hearing back from our patients is one way we can continue to improve our services. Please take a few minutes to complete the written survey that you may receive in the mail after your visit with us. Thank you!             Your Updated Medication List - Protect others around you: Learn how to safely use, store and throw away your medicines at www.disposemymeds.org.          This list is accurate as of: 9/18/17  3:31 PM.  Always use your most recent med list.                   Brand Name Dispense Instructions for use Diagnosis    hydrOXYzine 25 MG capsule    VISTARIL    30 capsule    Take 1 capsule (25 mg) by mouth 3 times daily as needed (spasm)    AC (acromioclavicular) joint arthritis, Shoulder impingement, right, Complete tear of right rotator cuff       oxyCODONE-acetaminophen 5-325 MG per tablet    PERCOCET    60 tablet    Take 1-2 tablets by mouth every 4 hours as needed for pain (moderate to severe)    AC (acromioclavicular) joint arthritis, Shoulder impingement, right, Complete tear of right rotator cuff

## 2017-11-20 ENCOUNTER — TELEPHONE (OUTPATIENT)
Dept: OPHTHALMOLOGY | Facility: CLINIC | Age: 52
End: 2017-11-20

## 2017-11-20 NOTE — TELEPHONE ENCOUNTER
Received a faxed request from [facility] to fax Dr. Grajeda's chart notes, for his appointment on 11-22-17.   Chart notes from 9-18-17 exam faxed to [facility].

## 2017-11-29 ENCOUNTER — TRANSFERRED RECORDS (OUTPATIENT)
Dept: HEALTH INFORMATION MANAGEMENT | Facility: CLINIC | Age: 52
End: 2017-11-29

## 2017-12-28 ENCOUNTER — OFFICE VISIT (OUTPATIENT)
Dept: FAMILY MEDICINE | Facility: CLINIC | Age: 52
End: 2017-12-28
Payer: COMMERCIAL

## 2017-12-28 VITALS
DIASTOLIC BLOOD PRESSURE: 90 MMHG | BODY MASS INDEX: 26.41 KG/M2 | TEMPERATURE: 98.2 F | WEIGHT: 195 LBS | SYSTOLIC BLOOD PRESSURE: 144 MMHG | HEIGHT: 72 IN | HEART RATE: 102 BPM

## 2017-12-28 DIAGNOSIS — R03.0 ELEVATED BLOOD PRESSURE READING WITHOUT DIAGNOSIS OF HYPERTENSION: ICD-10-CM

## 2017-12-28 DIAGNOSIS — Z00.00 ROUTINE GENERAL MEDICAL EXAMINATION AT A HEALTH CARE FACILITY: Primary | ICD-10-CM

## 2017-12-28 DIAGNOSIS — Z12.11 SCREEN FOR COLON CANCER: ICD-10-CM

## 2017-12-28 PROCEDURE — 36415 COLL VENOUS BLD VENIPUNCTURE: CPT | Performed by: FAMILY MEDICINE

## 2017-12-28 PROCEDURE — G0103 PSA SCREENING: HCPCS | Performed by: FAMILY MEDICINE

## 2017-12-28 PROCEDURE — 80061 LIPID PANEL: CPT | Performed by: FAMILY MEDICINE

## 2017-12-28 PROCEDURE — 99396 PREV VISIT EST AGE 40-64: CPT | Performed by: FAMILY MEDICINE

## 2017-12-28 PROCEDURE — 80048 BASIC METABOLIC PNL TOTAL CA: CPT | Performed by: FAMILY MEDICINE

## 2017-12-28 PROCEDURE — 84443 ASSAY THYROID STIM HORMONE: CPT | Performed by: FAMILY MEDICINE

## 2017-12-28 NOTE — MR AVS SNAPSHOT
After Visit Summary   12/28/2017    Jose G Pena    MRN: [id number]           Patient Information     Date Of Birth          1965        Visit Information        Provider Department      12/28/2017 1:40 PM Barrera Frazier MD [facility]        Today's Diagnoses     Routine general medical examination at a health care facility    -  1    Screen for colon cancer        Elevated blood pressure reading without diagnosis of hypertension          Care Instructions      Preventive Health Recommendations  Male Ages 50 - 64    Yearly exam:             See your health care provider every year in order to  o   Review health changes.   o   Discuss preventive care.    o   Review your medicines if your doctor has prescribed any.     Have a cholesterol test every 5 years, or more frequently if you are at risk for high cholesterol/heart disease.     Have a diabetes test (fasting glucose) every three years. If you are at risk for diabetes, you should have this test more often.     Have a colonoscopy at age 50, or have a yearly FIT test (stool test). These exams will check for colon cancer.      Talk with your health care provider about whether or not a prostate cancer screening test (PSA) is right for you.    You should be tested each year for STDs (sexually transmitted diseases), if you re at risk.     Shots: Get a flu shot each year. Get a tetanus shot every 10 years.     Nutrition:    Eat at least 5 servings of fruits and vegetables daily.     Eat whole-grain bread, whole-wheat pasta and brown rice instead of white grains and rice.     Talk to your provider about Calcium and Vitamin D.     Lifestyle    Exercise for at least 150 minutes a week (30 minutes a day, 5 days a week). This will help you control your weight and prevent disease.     Limit alcohol to one drink per day.     No smoking.     Wear sunscreen to prevent skin cancer.     See your dentist every six months for an exam  and cleaning.     See your eye doctor every 1 to 2 years.  [facility] Hypertension Study Summary     Thank you for your interest in the [facility] hypertension research study. This study is designed to test whether genetically guided blood pressure medication management is better than the standard of care.  Both groups will use medications that have been used for many years to treat high blood pressure ( diuretics, beta blockers, calcium channel blockers, AceI /ARB).  The ORDER of medications chosen may be different however. All participants will receive the genetic testing for free along with free research related visits.  Participants will not be given the results of their genetic test results until the END of the study.Participants will also receive compensation totaling about $100 for completing all study visits and surveys.      If you would like to enroll or know more about using your genetics to determine which hypertensive medications may work best for you please contact the research coordinator as [phone] or email [email or web address]    Who may qualify for the study:  1) New diagnosis of high blood pressure but not yet on blood pressure medication.  2) Existing diagnosis of hypertension but blood pressure is  uncontrolled with 1 or less class of medications.   3) Age between 30 and 70  4) BMI between 19-50    Who should NOT be in the study:    1) All patient taking more than 1 class of hypertensive medication are excluded.   2) Documented instance of following conditions    A. Cardiac Disease  i. ICD-10 Code for Coronary Artery Disease - CAD: 410.* -414.*, I25.*  ii. ICD-10 Code for Heart Failure - 428.*, I50.*  iii. ICD-10 Code for Congenital Cardiac Disease - 745.*, -747.*, Q20.*, -Q28.*   B. Kidney Disease        i. ICD-10 Code for Chronic Kidney Disease - CKD:ICD9 585.*and ICD10 N18.*   C. Vascular Disease        i. ICD-10 Code for Peripheral Vascular Disease - 443.9, I73.9        ii. ICD-10 Code for  Pulmonary Hypertension - 416.0, 416.8, I27.0, I27.2   D. Secondary Hypertension                     i. ICD-10 Code for Hypertension Secondary to Other Diseases - I15.0-2, I15.8-9  3) Any patient who has chronic medical conditions that  their doctor/provider feels would make them unsafe to participate in a research study where initial choice of blood pressure  medication could be from 1 of these 4 BP classes of medicines: diuretics, beta blockers, calcium channel blockers, AceI /ARB.    Study visit occur at the following clinic locations  North:  1) [place]  2) [place]  3) [place]  4) [place]  5) [place]  6) [place]  7) [place]    South:  1) [facility])  2) [place]  3) [facility]:  1) [place]    Patient Expectations:    1) Take Hypertension medications  2) Attend scheduled study visits  3) Complete online surveys sent between visits     If enrolled patient is asked to attend 5 to 8 study visits over the next 12 months.    [facility]   Discharged by : tatianna    Paper scripts provided to patient : none     If you have any questions regarding your visit please contact your care team:     Team Gold                Clinic Hours Telephone Number     Dr. Tiff Dyson 7am-7pm  Monday - Thursday   7am-5pm  Fridays  [phone]   (Appointment scheduling available 24/7)     RN Line  [phone] option 2     Urgent Care - Zoya Vasquez and Kristie Vasquez - 11am-9pm Monday-Friday Saturday-Sunday- 9am-5pm     [place] -   5pm-9pm Monday-Friday Saturday-Sunday- 9am-5pm    [phone] - Zoya Vasquez    [phone] - Kristie       For a Price Quote for your services, please call our Consumer Price Line at [phone].     What options do I have for visits at the clinic other than the traditional office visit?     To expand how we care for you, many of our providers are  utilizing electronic visits (e-visits) and telephone visits, when medically appropriate, for interactions with their patients rather than a visit in the clinic. We also offer nurse visits for many medical concerns. Just like any other service, we will bill your insurance company for this type of visit based on time spent on the phone with your provider. Not all insurance companies cover these visits. Please check with your medical insurance if this type of visit is covered. You will be responsible for any charges that are not paid by your insurance.   E-visits via Spacebikinihart: generally incur a $35.00 fee.     Telephone visits:   Time spent on the phone: *charged based on time that is spent on the phone in increments of 10 minutes. Estimated cost:   5-10 mins $30.00   11-20 mins. $59.00   21-30 mins. $85.00     Use Studio Modernat (secure email communication and access to your chart) to send your primary care provider a message or make an appointment. Ask someone on your Team how to sign up for Studio Modernat.     As always, Thank you for trusting us with your health care needs!      [facility]:    Scheduling Appointments  [facility]  Call: [phone]    [facility]  Call: [phone]    [facility]  Call: [phone]    For Gastroenterology referrals   [facility] Gastroenterology   Clinics and Surgery Center, 4th Floor   [address] [place] [address]   Appointments: [phone]    WHERE TO GO FOR CARE?  Clinic    Make an appointment if you:       Are sick (cold, cough, flu, sore throat, earache or in pain).       Have a small injury (sprain, small cut, burn or broken bone).       Need a physical exam, Pap smear, vaccine or prescription refill.       Have questions about your health or medicines.    To reach us:      Call [phone] ([phone]). Open 24 hours every day. (For counseling services, call  [phone].)    Log into Noster Mobile at [email or web address]. (Visit [email or web address] to create an account.) Hospital emergency room    An emergency is a serious or life- threatening problem that must be treated right away.    Call 911 or get to the hospital if you have:      Very bad or sudden:            - Chest pain or pressure         - Bleeding         - Head or belly pain         - Dizziness or trouble seeing, walking or                          Speaking      Problems breathing      Blood in your vomit or you are coughing up blood      A major injury (knocked out, loss of a finger or limb, rape, broken bone protruding from skin)    A mental health crisis. (Or call the Mental Health Crisis line at 1-809.293.1323 or Suicide Prevention Hotline at 1-778.621.6820.)    Open 24 hours every day. You don't need an appointment.     Urgent care    Visit urgent care for sickness or small injuries when the clinic is closed. You don't need an appointment. To check hours or find an urgent care near you, visit [email or web address]. Online care    Get online care from MarginLeft for more than 70 common problems, like colds, allergies and infections. Open 24 hours every day at:   [email or web address]   Need help deciding?    For advice about where to be seen, you may call your clinic and ask to speak with a nurse. We're here for you 24 hours every day.         If you are deaf or hard of hearing, please let us know. We provide many free services including sign language interpreters, oral interpreters, TTYs, telephone amplifiers, note takers and written materials.                   Follow-ups after your visit        Additional Services     GASTROENTEROLOGY ADULT REF PROCEDURE ONLY       Last Lab Result: No results found for: CR  There is no height or weight on file to calculate BMI.     Needed:  No  Language:  English    Patient will be contacted to schedule procedure.     Please be aware that coverage of these services is  "subject to the terms and limitations of your health insurance plan.  Call member services at your health plan with any benefit or coverage questions.  Any procedures must be performed at a [facility] facility OR coordinated by your clinic's referral office.    Please bring the following with you to your appointment:    (1) Any X-Rays, CTs or MRIs which have been performed.  Contact the facility where they were done to arrange for  prior to your scheduled appointment.    (2) List of current medications   (3) This referral request   (4) Any documents/labs given to you for this referral                  Who to contact     If you have questions or need follow up information about today's clinic visit or your schedule please contact [facility] directly at [phone].  Normal or non-critical lab and imaging results will be communicated to you by MyChart, letter or phone within 4 business days after the clinic has received the results. If you do not hear from us within 7 days, please contact the clinic through MyChart or phone. If you have a critical or abnormal lab result, we will notify you by phone as soon as possible.  Submit refill requests through 1CLICK or call your pharmacy and they will forward the refill request to us. Please allow 3 business days for your refill to be completed.          Additional Information About Your Visit        1CLICK Information     1CLICK lets you send messages to your doctor, view your test results, renew your prescriptions, schedule appointments and more. To sign up, go to [email or web address] . Click on \"Log in\" on the left side of the screen, which will take you to the Welcome page. Then click on \"Sign up Now\" on the right side of the page.     You will be asked to enter the access code listed below, as well as some personal information. Please follow the directions to create your username and password.     Your access code is: [email or web address]  Expires: " "3/28/2018  2:28 PM     Your access code will  in 90 days. If you need help or a new code, please call your [facility] or [phone].        Care EveryWhere ID     This is your Care EveryWhere ID. This could be used by other organizations to access your [facility] medical records  [id number]        Your Vitals Were     Pulse Temperature Height BMI (Body Mass Index)          102 98.2  F (36.8  C) (Oral) 6' 0.25\" (1.835 m) 26.26 kg/m2         Blood Pressure from Last 3 Encounters:   17 144/90   17 115/68   17 (!) 150/94    Weight from Last 3 Encounters:   17 195 lb (88.5 kg)   17 202 lb (91.6 kg)   17 203 lb (92.1 kg)              We Performed the Following     Basic metabolic panel  (Ca, Cl, CO2, Creat, Gluc, K, Na, BUN)     GASTROENTEROLOGY ADULT REF PROCEDURE ONLY     Lipid panel reflex to direct LDL Non-fasting     PSA, screen     TSH with free T4 reflex          Today's Medication Changes          These changes are accurate as of: 17  2:28 PM.  If you have any questions, ask your nurse or doctor.               Stop taking these medicines if you haven't already. Please contact your care team if you have questions.     hydrOXYzine 25 MG capsule   Commonly known as:  VISTARIL   Stopped by:  Barrera Frazier MD           oxyCODONE-acetaminophen 5-325 MG per tablet   Commonly known as:  PERCOCET   Stopped by:  Barrera Frazier MD                    Primary Care Provider Office Phone # Fax #    [facility] [place] [facility] [phone]       [address] [facility] [address]        Equal Access to Services     CAR WANG AH: [place], walambertda luqadaha, qaybta kaalmada kesha, yola sanford. So [facility] [phone].    ATENCIÓN: Si habla español, tiene a salmeron disposición servicios gratuitos de asistencia lingüística. Llame al [phone].    We comply with applicable federal " civil rights laws and [place] laws. We do not discriminate on the basis of race, color, national origin, age, disability, sex, sexual orientation, or gender identity.            Thank you!     Thank you for choosing [facility]  for your care. Our goal is always to provide you with excellent care. Hearing back from our patients is one way we can continue to improve our services. Please take a few minutes to complete the written survey that you may receive in the mail after your visit with us. Thank you!             Your Updated Medication List - Protect others around you: Learn how to safely use, store and throw away your medicines at www.disposemymeds.org.      Notice  As of 12/28/2017  2:28 PM    You have not been prescribed any medications.

## 2017-12-28 NOTE — LETTER
[facility]  [address] [address] [phone]                                                                                                January 8, 2018    Jose G Pena  [address]  [place] [address]        Dear Mr. Pena,    Results of tests from your recent physical look good.   Recheck on one year for the blood sugar and PSA would be advised.  Cholesterol and thyroid look good and do not need to be checked each year.  It was nice to meet you and have a good 2018.     You may contact the clinic at [phone] if you have any questions or concerns about this request.      Sincerely,      Barrera Frazier MD/    Results for orders placed or performed in visit on 12/28/17   Lipid panel reflex to direct LDL Non-fasting   Result Value Ref Range    Cholesterol 176 <200 mg/dL    Triglycerides 42 <150 mg/dL    HDL Cholesterol 102 >39 mg/dL    LDL Cholesterol Calculated 66 <100 mg/dL    Non HDL Cholesterol 74 <130 mg/dL   Basic metabolic panel  (Ca, Cl, CO2, Creat, Gluc, K, Na, BUN)   Result Value Ref Range    Sodium 138 133 - 144 mmol/L    Potassium 4.8 3.4 - 5.3 mmol/L    Chloride 102 94 - 109 mmol/L    Carbon Dioxide 29 20 - 32 mmol/L    Anion Gap 7 3 - 14 mmol/L    Glucose 93 70 - 99 mg/dL    Urea Nitrogen 9 7 - 30 mg/dL    Creatinine 0.69 0.66 - 1.25 mg/dL    GFR Estimate >90 >60 mL/min/1.7m2    GFR Estimate If Black >90 >60 mL/min/1.7m2    Calcium 9.2 8.5 - 10.1 mg/dL   PSA, screen   Result Value Ref Range    PSA 1.33 0 - 4 ug/L   TSH with free T4 reflex   Result Value Ref Range    TSH 0.52 0.40 - 4.00 mU/L

## 2017-12-28 NOTE — PROGRESS NOTES
SUBJECTIVE:   CC: Jose G Pena is an 52 year old male who presents for preventative health visit.     Physical   Annual:     Getting at least 3 servings of Calcium per day::  Yes    Bi-annual eye exam::  Yes    Dental care twice a year::  Yes    Sleep apnea or symptoms of sleep apnea::  None    Diet::  Regular (no restrictions)    Frequency of exercise::  4-5 days/week    Duration of exercise::  45-60 minutes    Taking medications regularly::  Yes    Medication side effects::  None    Additional concerns today::  No          patient has had intermittently elevated blood pressure in the past.  Does not check routinely outside the office.  No symptoms.  No other concerns noted at this time.    Today's PHQ-2 Score:   PHQ-2 ( 1999 Pf[employer]) 12/28/2017   Q1: Little interest or pleasure in doing things 0   Q2: Feeling down, depressed or hopeless 0   PHQ-2 Score 0   Q1: Little interest or pleasure in doing things Not at all   Q2: Feeling down, depressed or hopeless Not at all   PHQ-2 Score 0       Abuse: Current or Past(Physical, Sexual or Emotional)- No  Do you feel safe in your environment - Yes    Social History   Substance Use Topics     Smoking status: Current Every Day Smoker     Packs/day: 0.50     Years: 5.00     Types: Cigarettes     Smokeless tobacco: Never Used      Comment: occ, not daily     Alcohol use Yes      Comment: every other day, 4 beers at a time, average     Alcohol Use 12/28/2017   If you drink alcohol, do you typically have greater than 3 drinks per day OR greater than 7 drinks per week?   Yes   AUDIT SCORE  6     AUDIT - Alcohol Use Disorders Identification Test - Reproduced from the World Health Organization Audit 2001 (Second Edition) 12/28/2017   1.  How often do you have a drink containing alcohol? 2 to 3 times a week   2.  How many drinks containing alcohol do you have on a typical day when you are drinking? 3 or 4   3.  How often do you have five or more drinks on one occasion? Monthly   4.   How often during the last year have you found that you were not able to stop drinking once you had started? Never   5.  How often during the last year have you failed to do what was normally expected of you because of drinking? Never   6.  How often during the last year have you needed a first drink in the morning to get yourself going after a heavy drinking session? Never   7.  How often during the last year have you had a feeling of guilt or remorse after drinking? Never   8.  How often during the last year have you been unable to remember what happened the night before because of your drinking? Never   9.  Have you or someone else been injured because of your drinking? No   10. Has a relative, friend, doctor or other health care worker been concerned about your drinking or suggested you cut down? No   TOTAL SCORE 6         Last PSA: No results found for: PSA    Reviewed orders with patient. Reviewed health maintenance and updated orders accordingly - Yes  BP Readings from Last 3 Encounters:   12/28/17 144/90   02/17/17 115/68   02/13/17 (!) 150/94    Wt Readings from Last 3 Encounters:   12/28/17 195 lb (88.5 kg)   03/02/17 202 lb (91.6 kg)   02/13/17 203 lb (92.1 kg)                  Patient Active Problem List   Diagnosis     CARDIOVASCULAR SCREENING; LDL GOAL LESS THAN 160     Tobacco abuse     AC (acromioclavicular) joint arthritis     Shoulder impingement, right     Complete tear of right rotator cuff     Past Surgical History:   Procedure Laterality Date     ARTHROTOMY SHOULDER, ROTATOR CUFF REPAIR, COMBINED Right 2/17/2017    Procedure: COMBINED ARTHROTOMY SHOULDER, ROTATOR CUFF REPAIR;  Surgeon: Kavin Lambert MD;  Location:  OR     SURGICAL HISTORY OF -       left wrist tendon repair after injury       Social History   Substance Use Topics     Smoking status: Current Every Day Smoker     Packs/day: 0.50     Years: 5.00     Types: Cigarettes     Smokeless tobacco: Never Used      Comment: occ,  "not daily     Alcohol use Yes      Comment: every other day, 4 beers at a time, average     Family History   Problem Relation Age of Onset     Anxiety Disorder Mother      Cataracts Paternal Grandmother          No current outpatient prescriptions on file.     No Known Allergies        Reviewed and updated as needed this visit by clinical staffTobacco  Allergies  Meds  Problems  Med Hx  Surg Hx  Fam Hx  Soc Hx          Reviewed and updated as needed this visit by Provider  Tobacco  Allergies  Meds  Problems  Med Hx  Surg Hx  Fam Hx  Soc Hx         Past Medical History:   Diagnosis Date     NO ACTIVE PROBLEMS (aka NONE)       Past Surgical History:   Procedure Laterality Date     ARTHROTOMY SHOULDER, ROTATOR CUFF REPAIR, COMBINED Right 2/17/2017    Procedure: COMBINED ARTHROTOMY SHOULDER, ROTATOR CUFF REPAIR;  Surgeon: Kavin Lambert MD;  Location: [facility] OR     SURGICAL HISTORY OF -       left wrist tendon repair after injury       Review of Systems  C: NEGATIVE for fever, chills, change in weight  I: NEGATIVE for worrisome rashes, moles or lesions  E: NEGATIVE for vision changes or irritation  ENT: NEGATIVE for ear, mouth and throat problems  R: NEGATIVE for significant cough or SOB  CV: NEGATIVE for chest pain, palpitations or peripheral edema  GI: NEGATIVE for nausea, abdominal pain, heartburn, or change in bowel habits   male: negative for dysuria, hematuria, decreased urinary stream, erectile dysfunction, urethral discharge  M: NEGATIVE for significant arthralgias or myalgia  N: NEGATIVE for weakness, dizziness or paresthesias  E: NEGATIVE for temperature intolerance, skin/hair changes  P: NEGATIVE for changes in mood or affect    OBJECTIVE:   /90  Pulse 102  Temp 98.2  F (36.8  C) (Oral)  Ht 6' 0.25\" (1.835 m)  Wt 195 lb (88.5 kg)  BMI 26.26 kg/m2    Physical Exam  GENERAL: healthy, alert and no distress  EYES: Eyes grossly normal to inspection, PERRL and conjunctivae and sclerae " normal  HENT: ear canals and TM's normal, nose and mouth without ulcers or lesions  NECK: no adenopathy, no asymmetry, masses, or scars and thyroid normal to palpation  RESP: lungs clear to auscultation - no rales, rhonchi or wheezes  CV: regular rate and rhythm, normal S1 S2, no S3 or S4, no murmur, click or rub, no peripheral edema and peripheral pulses strong  ABDOMEN: soft, nontender, no hepatosplenomegaly, no masses and bowel sounds normal   (male): normal male genitalia without lesions or urethral discharge, no hernia  MS: no gross musculoskeletal defects noted, no edema  SKIN: no suspicious lesions or rashes  NEURO: Normal strength and tone, mentation intact and speech normal  PSYCH: mentation appears normal, affect normal/bright    ASSESSMENT/PLAN:   1. Routine general medical examination at a health care facility  Routine health issues for age were reviewed.  Laboratory studies were ordered as noted.  Recheck in 1 year.  - Lipid panel reflex to direct LDL Non-fasting  - PSA, screen  - TSH with free T4 reflex    2. Screen for colon cancer  Colon cancer screening methods were discussed and he is interested in a colonoscopy at this time.  Referral was placed.  Nature of the procedure was discussed.  - GASTROENTEROLOGY ADULT REF PROCEDURE ONLY    3. Elevated blood pressure reading without diagnosis of hypertension  Check BMP today.  Information was given regarding the current ongoing study at [facility] for patients with elevated blood pressure and he will think about it.  Recommended checking blood pressure values at home and consider shorter-term follow-up in the office.  - Basic metabolic panel  (Ca, Cl, CO2, Creat, Gluc, K, Na, BUN)    COUNSELING:   Reviewed preventive health counseling, as reflected in patient instructions       Regular exercise       Healthy diet/nutrition       Vision screening       Hearing screening       Safe sex practices/STD prevention       Colon cancer screening       Prostate  "cancer screening    BP Screening:   Last 3 BP Readings:    BP Readings from Last 3 Encounters:   12/28/17 144/90   02/17/17 115/68   02/13/17 (!) 150/94       The following was recommended to the patient:  Re-screen within 4 weeks and recommend lifestyle modifications       reports that he has been smoking Cigarettes.  He has a 2.50 pack-year smoking history. He has never used smokeless tobacco.  Tobacco Cessation Action Plan: Self help information given to patient  Estimated body mass index is 26.26 kg/(m^2) as calculated from the following:    Height as of this encounter: 6' 0.25\" (1.835 m).    Weight as of this encounter: 195 lb (88.5 kg).         Counseling Resources:  ATP IV Guidelines  Pooled Cohorts Equation Calculator  FRAX Risk Assessment  ICSI Preventive Guidelines  Dietary Guidelines for Americans, 2010  USDA's MyPlate  ASA Prophylaxis  Lung CA Screening    Barrera Frazier MD  [facility] for HPI/ROS submitted by the patient on 12/28/2017   PHQ-2 Score: 0    "

## 2017-12-28 NOTE — PATIENT INSTRUCTIONS
Preventive Health Recommendations  Male Ages 50   64    Yearly exam:             See your health care provider every year in order to  o   Review health changes.   o   Discuss preventive care.    o   Review your medicines if your doctor has prescribed any.     Have a cholesterol test every 5 years, or more frequently if you are at risk for high cholesterol/heart disease.     Have a diabetes test (fasting glucose) every three years. If you are at risk for diabetes, you should have this test more often.     Have a colonoscopy at age 50, or have a yearly FIT test (stool test). These exams will check for colon cancer.      Talk with your health care provider about whether or not a prostate cancer screening test (PSA) is right for you.    You should be tested each year for STDs (sexually transmitted diseases), if you re at risk.     Shots: Get a flu shot each year. Get a tetanus shot every 10 years.     Nutrition:    Eat at least 5 servings of fruits and vegetables daily.     Eat whole-grain bread, whole-wheat pasta and brown rice instead of white grains and rice.     Talk to your provider about Calcium and Vitamin D.     Lifestyle    Exercise for at least 150 minutes a week (30 minutes a day, 5 days a week). This will help you control your weight and prevent disease.     Limit alcohol to one drink per day.     No smoking.     Wear sunscreen to prevent skin cancer.     See your dentist every six months for an exam and cleaning.     See your eye doctor every 1 to 2 years.  [facility] Hypertension Study Summary     Thank you for your interest in the [facility] hypertension research study. This study is designed to test whether genetically guided blood pressure medication management is better than the standard of care.  Both groups will use medications that have been used for many years to treat high blood pressure ( diuretics, beta blockers, calcium channel blockers, AceI /ARB).  The ORDER of medications chosen may be different  however. All participants will receive the genetic testing for free along with free research related visits.  Participants will not be given the results of their genetic test results until the END of the study.Participants will also receive compensation totaling about $100 for completing all study visits and surveys.      If you would like to enroll or know more about using your genetics to determine which hypertensive medications may work best for you please contact the research coordinator as [phone] or email [email or web address]    Who may qualify for the study:  1) New diagnosis of high blood pressure but not yet on blood pressure medication.  2) Existing diagnosis of hypertension but blood pressure is  uncontrolled with 1 or less class of medications.   3) Age between 30 and 70  4) BMI between 19-50    Who should NOT be in the study:    1) All patient taking more than 1 class of hypertensive medication are excluded.   2) Documented instance of following conditions    A. Cardiac Disease  i. ICD-10 Code for Coronary Artery Disease   CAD: 410.* -414.*, I25.*  ii. ICD-10 Code for Heart Failure   428.*, I50.*  iii. ICD-10 Code for Congenital Cardiac Disease   745.*, -747.*, Q20.*, -Q28.*   B. Kidney Disease        i. ICD-10 Code for Chronic Kidney Disease   CKD:ICD9 585.*and ICD10 N18.*   C. Vascular Disease        i. ICD-10 Code for Peripheral Vascular Disease   443.9, I73.9        ii. ICD-10 Code for Pulmonary Hypertension   416.0, 416.8, I27.0, I27.2   D. Secondary Hypertension                     i. ICD-10 Code for Hypertension Secondary to Other Diseases   I15.0-2, I15.8-9  3) Any patient who has chronic medical conditions that  their doctor/provider feels would make them unsafe to participate in a research study where initial choice of blood pressure  medication could be from 1 of these 4 BP classes of medicines: diuretics, beta blockers, calcium channel blockers, AceI /ARB.    Study visit occur at the  following clinic locations  North:  1) [place]  2) [place]  3) [place]  4) [place]  5) [place]  6) [place]  7) [place]    South:  1) [facility]  2) [place]  [facility]) [facility]  1) [place]    Patient Expectations:    1) Take Hypertension medications  2) Attend scheduled study visits  3) Complete online surveys sent between visits     If enrolled patient is asked to attend 5 to 8 study visits over the next 12 months.    [facility]   Discharged by : tatianna    Paper scripts provided to patient : none     If you have any questions regarding your visit please contact your care team:     Team Gold                Clinic Hours Telephone Number     Dr. Tiff Dyson 7am-7pm  Monday - Thursday   7am-5pm  Fridays  [phone]   (Appointment scheduling available 24/7)     RN Line  [phone] option 2     Urgent Care - [place] and [place] - 11am-9pm Monday-Friday Saturday-Sunday- 9am-5pm     [place] -   5pm-9pm Monday-Friday Saturday-Sunday- 9am-5pm    [phone] - [place]    [phone] - [place]       For a Price Quote for your services, please call our Consumer Price Line at [phone].     What options do I have for visits at the clinic other than the traditional office visit?     To expand how we care for you, many of our providers are utilizing electronic visits (e-visits) and telephone visits, when medically appropriate, for interactions with their patients rather than a visit in the clinic. We also offer nurse visits for many medical concerns. Just like any other service, we will bill your insurance company for this type of visit based on time spent on the phone with your provider. Not all insurance companies cover these visits. Please check with your medical insurance if this type of visit is covered. You will be responsible for any  charges that are not paid by your insurance.   E-visits via [employer]: generally incur a $35.00 fee.     Telephone visits:   Time spent on the phone: *charged based on time that is spent on the phone in increments of 10 minutes. Estimated cost:   5-10 mins $30.00   11-20 mins. $59.00   21-30 mins. $85.00     Use [employer] (secure email communication and access to your chart) to send your primary care provider a message or make an appointment. Ask someone on your Team how to sign up for [employer].     As always, Thank you for trusting us with your health care needs!      [facility]:    Scheduling Appointments  [facility]  Call: [phone]    Sheila Crow[facility]  Call: [phone]    [facility]  Call: [phone]    For Gastroenterology referrals   [facility]   [facility] and [facility] Center, 4th Floor   [address] [place] [address]   Appointments: [phone]    WHERE TO GO FOR CARE?  Clinic    Make an appointment if you:       Are sick (cold, cough, flu, sore throat, earache or in pain).       Have a small injury (sprain, small cut, burn or broken bone).       Need a physical exam, Pap smear, vaccine or prescription refill.       Have questions about your health or medicines.    To reach us:      Call [phone] ([phone]). Open 24 hours every day. (For counseling services, call [phone].)    Log into [email or web address] at [email or web address]. (Visit [email or web address] to create an account.) Hospital emergency room    An emergency is a serious or life- threatening problem that must be treated right away.    Call 842 or get to the hospital if you have:      Very bad or sudden:            - Chest pain or pressure         - Bleeding         - Head or belly pain         - Dizziness or trouble seeing, walking or                          Speaking      Problems breathing      Blood in your vomit or you are  coughing up blood      A major injury (knocked out, loss of a finger or limb, rape, broken bone protruding from skin)    A mental health crisis. (Or call the Mental Health Crisis line at 1-895.872.2275 or Suicide Prevention Hotline at 1-961.794.3656.)    Open 24 hours every day. You don't need an appointment.     Urgent care    Visit urgent care for sickness or small injuries when the clinic is closed. You don't need an appointment. To check hours or find an urgent care near you, visit [email or web address]. Online care    Get online care from [facility] for more than 70 common problems, like colds, allergies and infections. Open 24 hours every day at:   [email or web address]   Need help deciding?    For advice about where to be seen, you may call your clinic and ask to speak with a nurse. We're here for you 24 hours every day.         If you are deaf or hard of hearing, please let us know. We provide many free services including sign language interpreters, oral interpreters, TTYs, telephone amplifiers, note takers and written materials.

## 2017-12-29 LAB
ANION GAP SERPL CALCULATED.3IONS-SCNC: 7 MMOL/L (ref 3–14)
BUN SERPL-MCNC: 9 MG/DL (ref 7–30)
CALCIUM SERPL-MCNC: 9.2 MG/DL (ref 8.5–10.1)
CHLORIDE SERPL-SCNC: 102 MMOL/L (ref 94–109)
CHOLEST SERPL-MCNC: 176 MG/DL
CO2 SERPL-SCNC: 29 MMOL/L (ref 20–32)
CREAT SERPL-MCNC: 0.69 MG/DL (ref 0.66–1.25)
GFR SERPL CREATININE-BSD FRML MDRD: >90 ML/MIN/1.7M2
GLUCOSE SERPL-MCNC: 93 MG/DL (ref 70–99)
HDLC SERPL-MCNC: 102 MG/DL
LDLC SERPL CALC-MCNC: 66 MG/DL
NONHDLC SERPL-MCNC: 74 MG/DL
POTASSIUM SERPL-SCNC: 4.8 MMOL/L (ref 3.4–5.3)
PSA SERPL-ACNC: 1.33 UG/L (ref 0–4)
SODIUM SERPL-SCNC: 138 MMOL/L (ref 133–144)
TRIGL SERPL-MCNC: 42 MG/DL
TSH SERPL DL<=0.005 MIU/L-ACNC: 0.52 MU/L (ref 0.4–4)

## 2018-01-12 ENCOUNTER — TELEPHONE (OUTPATIENT)
Dept: NURSING | Facility: CLINIC | Age: 53
End: 2018-01-12

## 2018-02-12 ENCOUNTER — TELEPHONE (OUTPATIENT)
Dept: NURSING | Facility: CLINIC | Age: 53
End: 2018-02-12

## 2018-02-12 NOTE — TELEPHONE ENCOUNTER
Left a message for Jose G about the [facility] BP Study and to contact the Study at [phone] if he has interest in enrollment.

## 2018-03-28 ENCOUNTER — TRANSFERRED RECORDS (OUTPATIENT)
Dept: HEALTH INFORMATION MANAGEMENT | Facility: CLINIC | Age: 53
End: 2018-03-28

## 2018-07-16 ENCOUNTER — OFFICE VISIT (OUTPATIENT)
Dept: FAMILY MEDICINE | Facility: CLINIC | Age: 53
End: 2018-07-16
Payer: COMMERCIAL

## 2018-07-16 VITALS
DIASTOLIC BLOOD PRESSURE: 90 MMHG | OXYGEN SATURATION: 98 % | HEIGHT: 72 IN | SYSTOLIC BLOOD PRESSURE: 148 MMHG | HEART RATE: 80 BPM | BODY MASS INDEX: 27.09 KG/M2 | WEIGHT: 200 LBS | TEMPERATURE: 98.2 F

## 2018-07-16 DIAGNOSIS — I10 HYPERTENSION, BENIGN ESSENTIAL, GOAL BELOW 140/90: Primary | ICD-10-CM

## 2018-07-16 PROCEDURE — 99213 OFFICE O/P EST LOW 20 MIN: CPT | Performed by: FAMILY MEDICINE

## 2018-07-16 RX ORDER — LISINOPRIL 10 MG/1
10 TABLET ORAL DAILY
Qty: 30 TABLET | Refills: 1 | Status: SHIPPED | OUTPATIENT
Start: 2018-07-16 | End: 2018-08-27

## 2018-07-16 NOTE — MR AVS SNAPSHOT
"              After Visit Summary   7/16/2018    Jose G Pena    MRN: [id number]           Patient Information     Date Of Birth          1965        Visit Information        Provider Department      7/16/2018 9:00 AM Barrera Frazier MD [facility]        Today's Diagnoses     Hypertension, benign essential, goal below 140/90    -  1       Follow-ups after your visit        Follow-up notes from your care team     Return in about 4 weeks (around 8/13/2018).      Who to contact     If you have questions or need follow up information about today's clinic visit or your schedule please contact [facility] directly at [phone].  Normal or non-critical lab and imaging results will be communicated to you by MyChart, letter or phone within 4 business days after the clinic has received the results. If you do not hear from us within 7 days, please contact the clinic through MyChart or phone. If you have a critical or abnormal lab result, we will notify you by phone as soon as possible.  Submit refill requests through [employer] or call your pharmacy and they will forward the refill request to us. Please allow 3 business days for your refill to be completed.          Additional Information About Your Visit        Care EveryWhere ID     This is your Care EveryWhere ID. This could be used by other organizations to access your [place] medical records  [id number]        Your Vitals Were     Pulse Temperature Height Pulse Oximetry BMI (Body Mass Index)       80 98.2  F (36.8  C) (Oral) 6' 0.25\" (1.835 m) 98% 26.94 kg/m2        Blood Pressure from Last 3 Encounters:   07/16/18 148/90   12/28/17 144/90   02/17/17 115/68    Weight from Last 3 Encounters:   07/16/18 200 lb (90.7 kg)   12/28/17 195 lb (88.5 kg)   03/02/17 202 lb (91.6 kg)              Today, you had the following     No orders found for display         Today's Medication Changes          These changes are accurate " as of 7/16/18  9:41 AM.  If you have any questions, ask your nurse or doctor.               Start taking these medicines.        Dose/Directions    lisinopril 10 MG tablet   Commonly known as:  PRINIVIL/ZESTRIL   Used for:  Hypertension, benign essential, goal below 140/90   Started by:  Barrera Frazier MD        Dose:  10 mg   Take 1 tablet (10 mg) by mouth daily   Quantity:  30 tablet   Refills:  1            Where to get your medicines      These medications were sent to [id number] - [place] [address] [place] AT [address] [place], [place] [address]     Phone:  [phone]     lisinopril 10 MG tablet                Primary Care Provider Office Phone # Fax #    [facility] [phone]       [address] [place] [address]        Equal Access to Services     CAR WANG : Hadii yumiko ku hadasho Soomaali, waaxda luqadaha, qaybta kaalmada adeegyada, waxay idiin hayaan shaun chapin . So [facility] [phone].    ATENCIÓN: Si habla español, tiene a salmeron disposición servicios gratuitos de asistencia lingüística. [facility] [phone].    We comply with applicable federal civil rights laws and [place] laws. We do not discriminate on the basis of race, color, national origin, age, disability, sex, sexual orientation, or gender identity.            Thank you!     Thank you for choosing [facility] [place]  for your care. Our goal is always to provide you with excellent care. Hearing back from our patients is one way we can continue to improve our services. Please take a few minutes to complete the written survey that you may receive in the mail after your visit with us. Thank you!             Your Updated Medication List - Protect others around you: Learn how to safely use, store and throw away your medicines at www.disposemymeds.org.          This list is accurate as of 7/16/18   9:41 AM.  Always use your most recent med list.                   Brand Name Dispense Instructions for use Diagnosis    lisinopril 10 MG tablet    PRINIVIL/ZESTRIL    30 tablet    Take 1 tablet (10 mg) by mouth daily    Hypertension, benign essential, goal below 140/90

## 2018-07-16 NOTE — PROGRESS NOTES
SUBJECTIVE:   Jose G Pena is a 52 year old male who presents to clinic today for the following health issues:      ED/UC Followup:    Facility:  [facility]  Date of visit: 6/21/18  Reason for visit: lightheadedness, high BP  Current Status: reports he has been doing OK since ER visit       Records regarding the emergency room visit reviewed with the patient.  He is serial troponins done that were negative at that time.  EKG was unremarkable for any ischemic events.  He is feeling better and has not had any further episodes of dizziness.  He describes the dizziness as a sensation of near syncope and movement.  Denies vertigo.  He also denied any chest pain, palpitations, or shortness of breath at the time of the symptoms.  Since his blood pressure was quite elevated in the emergency room evaluation and remains elevated at this time he is interested in treatment.  No other concerns were noted by the patient today.      Problem list and histories reviewed & adjusted, as indicated.  Additional history: as documented    Patient Active Problem List   Diagnosis     CARDIOVASCULAR SCREENING; LDL GOAL LESS THAN 160     Tobacco abuse     AC (acromioclavicular) joint arthritis     Shoulder impingement, right     Complete tear of right rotator cuff     Past Surgical History:   Procedure Laterality Date     ARTHROTOMY SHOULDER, ROTATOR CUFF REPAIR, COMBINED Right 2/17/2017    Procedure: COMBINED ARTHROTOMY SHOULDER, ROTATOR CUFF REPAIR;  Surgeon: Kavin Lambert MD;  Location:  OR     SURGICAL HISTORY OF -       left wrist tendon repair after injury       Social History   Substance Use Topics     Smoking status: Current Every Day Smoker     Packs/day: 0.50     Years: 5.00     Types: Cigarettes     Smokeless tobacco: Never Used      Comment: occ, not daily     Alcohol use Yes      Comment: every other day, 4 beers at a time, average     Family History   Problem Relation Age of Onset     Anxiety Disorder Mother       "Cataracts Paternal Grandmother          Current Outpatient Prescriptions   Medication Sig Dispense Refill     lisinopril (PRINIVIL/ZESTRIL) 10 MG tablet Take 1 tablet (10 mg) by mouth daily 30 tablet 1     No Known Allergies    Reviewed and updated as needed this visit by clinical staff  Tobacco  Allergies  Meds  Problems  Med Hx  Surg Hx  Fam Hx  Soc Hx        Reviewed and updated as needed this visit by Provider  Tobacco  Allergies  Meds  Problems  Med Hx  Surg Hx  Soc Hx        ROS:  Constitutional, HEENT, cardiovascular, pulmonary, gi and gu systems are negative, except as otherwise noted.    OBJECTIVE:     /90  Pulse 80  Temp 98.2  F (36.8  C) (Oral)  Ht 6' 0.25\" (1.835 m)  Wt 200 lb (90.7 kg)  SpO2 98%  BMI 26.94 kg/m2  Body mass index is 26.94 kg/(m^2).  GENERAL: healthy, alert and no distress  EYES: Eyes grossly normal to inspection, PERRL and conjunctivae and sclerae normal  NECK: no adenopathy, no asymmetry, masses, or scars and thyroid normal to palpation  RESP: lungs clear to auscultation - no rales, rhonchi or wheezes  CV: regular rate and rhythm, normal S1 S2, no S3 or S4, no murmur, click or rub, no peripheral edema and peripheral pulses strong  MS: no gross musculoskeletal defects noted, no edema  NEURO: Normal strength and tone, mentation intact and speech normal  PSYCH: mentation appears normal, affect normal/bright    Diagnostic Test Results:  Results for orders placed or performed in visit on 12/28/17   Lipid panel reflex to direct LDL Non-fasting   Result Value Ref Range    Cholesterol 176 <200 mg/dL    Triglycerides 42 <150 mg/dL    HDL Cholesterol 102 >39 mg/dL    LDL Cholesterol Calculated 66 <100 mg/dL    Non HDL Cholesterol 74 <130 mg/dL   Basic metabolic panel  (Ca, Cl, CO2, Creat, Gluc, K, Na, BUN)   Result Value Ref Range    Sodium 138 133 - 144 mmol/L    Potassium 4.8 3.4 - 5.3 mmol/L    Chloride 102 94 - 109 mmol/L    Carbon Dioxide 29 20 - 32 mmol/L    Anion " Gap 7 3 - 14 mmol/L    Glucose 93 70 - 99 mg/dL    Urea Nitrogen 9 7 - 30 mg/dL    Creatinine 0.69 0.66 - 1.25 mg/dL    GFR Estimate >90 >60 mL/min/1.7m2    GFR Estimate If Black >90 >60 mL/min/1.7m2    Calcium 9.2 8.5 - 10.1 mg/dL   PSA, screen   Result Value Ref Range    PSA 1.33 0 - 4 ug/L   TSH with free T4 reflex   Result Value Ref Range    TSH 0.52 0.40 - 4.00 mU/L       ASSESSMENT/PLAN:             1. Hypertension, benign essential, goal below 140/90  Patient was interested in starting treatment at this time.  We talked briefly about a hypertension study that [facility] has going at this time but he was not interested in that.  Medication options were reviewed with the patient and I have suggested initially trying lisinopril 10 mg p.o. daily.  Side effects were reviewed.  Follow-up is requested in 1-2 months for recheck.  Basic metabolic panel was done at the emergency room visit on June 21 and normal so we did not repeat that today.  - lisinopril (PRINIVIL/ZESTRIL) 10 MG tablet; Take 1 tablet (10 mg) by mouth daily  Dispense: 30 tablet; Refill: 1    FUTURE APPOINTMENTS:       - Follow-up visit in 1-2 months.    Barrera Frazier MD  [facility]

## 2018-07-17 PROBLEM — I10 HYPERTENSION, BENIGN ESSENTIAL, GOAL BELOW 140/90: Status: ACTIVE | Noted: 2018-07-17

## 2018-08-27 ENCOUNTER — OFFICE VISIT (OUTPATIENT)
Dept: FAMILY MEDICINE | Facility: CLINIC | Age: 53
End: 2018-08-27
Payer: COMMERCIAL

## 2018-08-27 VITALS
BODY MASS INDEX: 26.68 KG/M2 | HEIGHT: 72 IN | HEART RATE: 80 BPM | WEIGHT: 197 LBS | SYSTOLIC BLOOD PRESSURE: 134 MMHG | DIASTOLIC BLOOD PRESSURE: 76 MMHG | TEMPERATURE: 98.1 F

## 2018-08-27 DIAGNOSIS — I10 HYPERTENSION, BENIGN ESSENTIAL, GOAL BELOW 140/90: ICD-10-CM

## 2018-08-27 DIAGNOSIS — Z12.11 SCREEN FOR COLON CANCER: ICD-10-CM

## 2018-08-27 PROCEDURE — 36415 COLL VENOUS BLD VENIPUNCTURE: CPT | Performed by: FAMILY MEDICINE

## 2018-08-27 PROCEDURE — 99213 OFFICE O/P EST LOW 20 MIN: CPT | Performed by: FAMILY MEDICINE

## 2018-08-27 PROCEDURE — 80048 BASIC METABOLIC PNL TOTAL CA: CPT | Performed by: FAMILY MEDICINE

## 2018-08-27 RX ORDER — LISINOPRIL 10 MG/1
10 TABLET ORAL DAILY
Qty: 30 TABLET | Refills: 1 | Status: CANCELLED | OUTPATIENT
Start: 2018-08-27

## 2018-08-27 RX ORDER — LOSARTAN POTASSIUM 50 MG/1
50 TABLET ORAL DAILY
Qty: 30 TABLET | Refills: 1 | Status: SHIPPED | OUTPATIENT
Start: 2018-08-27 | End: 2018-12-10

## 2018-08-27 NOTE — PROGRESS NOTES
SUBJECTIVE:   Jose G Pena is a 52 year old male who presents to clinic today for the following health issues:      Hypertension Follow-up      Outpatient blood pressures are not being checked.     Low Salt Diet: no added salt      Amount of exercise or physical activity: 3 days weekly     Problems taking medications regularly: No    Medication side effects: none    Diet: low salt    Has noted bothersome dry cough and would like to try a different medication.  No other side effects noted.  Not checking values at home.        Problem list and histories reviewed & adjusted, as indicated.  Additional history: as documented    Patient Active Problem List   Diagnosis     CARDIOVASCULAR SCREENING; LDL GOAL LESS THAN 160     Tobacco abuse     AC (acromioclavicular) joint arthritis     Shoulder impingement, right     Complete tear of right rotator cuff     Hypertension, benign essential, goal below 140/90     Past Surgical History:   Procedure Laterality Date     ARTHROTOMY SHOULDER, ROTATOR CUFF REPAIR, COMBINED Right 2/17/2017    Procedure: COMBINED ARTHROTOMY SHOULDER, ROTATOR CUFF REPAIR;  Surgeon: Kavin Lambert MD;  Location: [facility] OR     SURGICAL HISTORY OF -       left wrist tendon repair after injury       Social History   Substance Use Topics     Smoking status: Current Every Day Smoker     Packs/day: 0.50     Years: 5.00     Types: Cigarettes     Smokeless tobacco: Never Used      Comment: occ, not daily     Alcohol use Yes      Comment: every other day, 4 beers at a time, average     Family History   Problem Relation Age of Onset     Anxiety Disorder Mother      Cataracts Paternal Grandmother          Current Outpatient Prescriptions   Medication Sig Dispense Refill     losartan (COZAAR) 50 MG tablet Take 1 tablet (50 mg) by mouth daily 30 tablet 1     No Known Allergies    Reviewed and updated as needed this visit by clinical staff  Tobacco  Allergies  Meds  Problems  Med Hx  Surg Hx  Fam Hx  Soc  "Hx        Reviewed and updated as needed this visit by Provider  Tobacco  Allergies  Meds  Problems  Med Hx  Surg Hx  Soc Hx        ROS:  Constitutional, HEENT, cardiovascular, pulmonary, gi and gu systems are negative, except as otherwise noted.    OBJECTIVE:     /76  Pulse 80  Temp 98.1  F (36.7  C) (Oral)  Ht 6' 0.25\" (1.835 m)  Wt 197 lb (89.4 kg)  BMI 26.53 kg/m2  Body mass index is 26.53 kg/(m^2).  GENERAL: healthy, alert and no distress  EYES: Eyes grossly normal to inspection, PERRL and conjunctivae and sclerae normal  NECK: no adenopathy, no asymmetry, masses, or scars and thyroid normal to palpation  RESP: lungs clear to auscultation - no rales, rhonchi or wheezes  CV: regular rate and rhythm, normal S1 S2, no S3 or S4, no murmur, click or rub, no peripheral edema and peripheral pulses strong  MS: no gross musculoskeletal defects noted, no edema  NEURO: Normal strength and tone, mentation intact and speech normal  PSYCH: mentation appears normal, affect normal/bright    Diagnostic Test Results:  none     ASSESSMENT/PLAN:             1. Hypertension, benign essential, goal below 140/90  Improved but cough from lisinopril.  Will switch to losartan.  Reviewed side effects.  Check bmp today.  Follow up 1-2 months, sooner if needed.  - losartan (COZAAR) 50 MG tablet; Take 1 tablet (50 mg) by mouth daily  Dispense: 30 tablet; Refill: 1  - Basic metabolic panel  (Ca, Cl, CO2, Creat, Gluc, K, Na, BUN)    2. Screen for colon cancer  Screening discussed today.  Offered FIT as alternative to colonoscopy at this time.  - Fecal colorectal cancer screen FIT - Future (S+30); Future    FUTURE APPOINTMENTS:       - Follow-up visit in 1-2 months.    Barrera Frazier MD  [facility]    "

## 2018-08-27 NOTE — MR AVS SNAPSHOT
"              After Visit Summary   8/27/2018    Jose G Pena    MRN: [id number]           Patient Information     Date Of Birth          1965        Visit Information        Provider Department      8/27/2018 10:20 AM Barrera Frazier MD [facility]        Today's Diagnoses     Hypertension, benign essential, goal below 140/90        Screen for colon cancer           Follow-ups after your visit        Follow-up notes from your care team     Return in about 2 months (around 10/27/2018) for BP Recheck before you run out of the new medication.      Future tests that were ordered for you today     Open Future Orders        Priority Expected Expires Ordered    Fecal colorectal cancer screen FIT - Future (S+30) Routine 9/17/2018 9/26/2018 8/27/2018            Who to contact     If you have questions or need follow up information about today's clinic visit or your schedule please contact [facility] directly at [phone].  Normal or non-critical lab and imaging results will be communicated to you by MyChart, letter or phone within 4 business days after the clinic has received the results. If you do not hear from us within 7 days, please contact the clinic through MyChart or phone. If you have a critical or abnormal lab result, we will notify you by phone as soon as possible.  Submit refill requests through [employer] or call your pharmacy and they will forward the refill request to us. Please allow 3 business days for your refill to be completed.          Additional Information About Your Visit        Care EveryWhere ID     This is your Care EveryWhere ID. This could be used by other organizations to access your [place] medical records  [id number]        Your Vitals Were     Pulse Temperature Height BMI (Body Mass Index)          80 98.1  F (36.7  C) (Oral) 6' 0.25\" (1.835 m) 26.53 kg/m2         Blood Pressure from Last 3 Encounters:   08/27/18 134/76   07/16/18 148/90 "   12/28/17 144/90    Weight from Last 3 Encounters:   08/27/18 197 lb (89.4 kg)   07/16/18 200 lb (90.7 kg)   12/28/17 195 lb (88.5 kg)              We Performed the Following     Basic metabolic panel  (Ca, Cl, CO2, Creat, Gluc, K, Na, BUN)          Today's Medication Changes          These changes are accurate as of 8/27/18 10:40 AM.  If you have any questions, ask your nurse or doctor.               Start taking these medicines.        Dose/Directions    losartan 50 MG tablet   Commonly known as:  COZAAR   Used for:  Hypertension, benign essential, goal below 140/90   Started by:  Barrera Frazier MD        Dose:  50 mg   Take 1 tablet (50 mg) by mouth daily   Quantity:  30 tablet   Refills:  1         Stop taking these medicines if you haven't already. Please contact your care team if you have questions.     lisinopril 10 MG tablet   Commonly known as:  PRINIVIL/ZESTRIL   Stopped by:  Barrera Frazier MD                Where to get your medicines      These medications were sent to [id number] - [place] [[facility] [place] [address] [address] [address]     Phone:  [phone]     losartan 50 MG tablet                Primary Care Provider Office Phone # Fax #    [facility] [phone]       [address] [place] [address]        Equal Access to Services     CAR WANG AH: Hadii yumiko arceoo Somayra, waaxda luqadaha, qaybta kaalmada adeegyada, yola chapin . So [facility] [phone].    ATENCIÓN: Si habla español, tiene a salmeron disposición servicios gratuitos de asistencia lingüística. Daisyame al [phone].    We comply with applicable federal civil rights laws and [place] laws. We do not discriminate on the basis of race, color, national origin, age, disability, sex, sexual orientation, or gender identity.            Thank you!     Thank you for choosing [facility]  [facility]  for your care. Our goal is always to provide you with excellent care. Hearing back from our patients is one way we can continue to improve our services. Please take a few minutes to complete the written survey that you may receive in the mail after your visit with us. Thank you!             Your Updated Medication List - Protect others around you: Learn how to safely use, store and throw away your medicines at www.disposemymeds.org.          This list is accurate as of 8/27/18 10:40 AM.  Always use your most recent med list.                   Brand Name Dispense Instructions for use Diagnosis    losartan 50 MG tablet    COZAAR    30 tablet    Take 1 tablet (50 mg) by mouth daily    Hypertension, benign essential, goal below 140/90

## 2018-08-27 NOTE — LETTER
August 28, 2018      Jose G Pena  [address] [place] [address]        Dear Jose G,       The results of your kidney function and electrolytes look good.  The mild elevation in the blood sugar is not concerning.   Enclosed are the results.    Results for orders placed or performed in visit on 08/27/18   Basic metabolic panel  (Ca, Cl, CO2, Creat, Gluc, K, Na, BUN)   Result Value Ref Range    Sodium 142 133 - 144 mmol/L    Potassium 4.3 3.4 - 5.3 mmol/L    Chloride 108 94 - 109 mmol/L    Carbon Dioxide 25 20 - 32 mmol/L    Anion Gap 9 3 - 14 mmol/L    Glucose 102 (H) 70 - 99 mg/dL    Urea Nitrogen 11 7 - 30 mg/dL    Creatinine 0.84 0.66 - 1.25 mg/dL    GFR Estimate >90 >60 mL/min/1.7m2    GFR Estimate If Black >90 >60 mL/min/1.7m2    Calcium 8.8 8.5 - 10.1 mg/dL         Please call with any questions.        Sincerely,      Barrera Frazier MD/kb

## 2018-08-28 LAB
ANION GAP SERPL CALCULATED.3IONS-SCNC: 9 MMOL/L (ref 3–14)
BUN SERPL-MCNC: 11 MG/DL (ref 7–30)
CALCIUM SERPL-MCNC: 8.8 MG/DL (ref 8.5–10.1)
CHLORIDE SERPL-SCNC: 108 MMOL/L (ref 94–109)
CO2 SERPL-SCNC: 25 MMOL/L (ref 20–32)
CREAT SERPL-MCNC: 0.84 MG/DL (ref 0.66–1.25)
GFR SERPL CREATININE-BSD FRML MDRD: >90 ML/MIN/1.7M2
GLUCOSE SERPL-MCNC: 102 MG/DL (ref 70–99)
POTASSIUM SERPL-SCNC: 4.3 MMOL/L (ref 3.4–5.3)
SODIUM SERPL-SCNC: 142 MMOL/L (ref 133–144)

## 2018-12-10 ENCOUNTER — OFFICE VISIT (OUTPATIENT)
Dept: FAMILY MEDICINE | Facility: CLINIC | Age: 53
End: 2018-12-10
Payer: COMMERCIAL

## 2018-12-10 VITALS
HEIGHT: 72 IN | HEART RATE: 76 BPM | BODY MASS INDEX: 26.68 KG/M2 | DIASTOLIC BLOOD PRESSURE: 74 MMHG | SYSTOLIC BLOOD PRESSURE: 134 MMHG | TEMPERATURE: 98.8 F | WEIGHT: 197 LBS

## 2018-12-10 DIAGNOSIS — I10 HYPERTENSION, BENIGN ESSENTIAL, GOAL BELOW 140/90: ICD-10-CM

## 2018-12-10 PROCEDURE — 99213 OFFICE O/P EST LOW 20 MIN: CPT | Performed by: FAMILY MEDICINE

## 2018-12-10 RX ORDER — LOSARTAN POTASSIUM 50 MG/1
50 TABLET ORAL DAILY
Qty: 90 TABLET | Refills: 3 | Status: SHIPPED | OUTPATIENT
Start: 2018-12-10 | End: 2020-09-21

## 2018-12-10 ASSESSMENT — MIFFLIN-ST. JEOR: SCORE: 1790.56

## 2018-12-10 NOTE — PROGRESS NOTES
SUBJECTIVE:   Jose G Pena is a 53 year old male who presents to clinic today for the following health issues:      Hypertension Follow-up      Outpatient blood pressures are not being checked.    Low Salt Diet: no added salt    Patient is tolerating the new medication well.  Cough that he had on the lisinopril improved significantly after discontinuation and switching to the losartan.  He does miss doses periodically.  He does not check his blood pressure outside the office.  He does report an eye appointment coming up in the near future for follow-up with the retina specialist.  As was the concern that originally prompted the treatment of the high blood pressure.  No other concerns were expressed by the patient today.    Problem list and histories reviewed & adjusted, as indicated.  Additional history: as documented    Patient Active Problem List   Diagnosis     CARDIOVASCULAR SCREENING; LDL GOAL LESS THAN 160     Tobacco abuse     AC (acromioclavicular) joint arthritis     Shoulder impingement, right     Complete tear of right rotator cuff     Hypertension, benign essential, goal below 140/90     Past Surgical History:   Procedure Laterality Date     ARTHROTOMY SHOULDER, ROTATOR CUFF REPAIR, COMBINED Right 2/17/2017    Procedure: COMBINED ARTHROTOMY SHOULDER, ROTATOR CUFF REPAIR;  Surgeon: Kavin Lambert MD;  Location:  OR     SURGICAL HISTORY OF -       left wrist tendon repair after injury       Social History     Tobacco Use     Smoking status: Current Every Day Smoker     Packs/day: 0.50     Years: 5.00     Pack years: 2.50     Types: Cigarettes     Smokeless tobacco: Never Used     Tobacco comment: occ, not daily   Substance Use Topics     Alcohol use: Yes     Comment: every other day, 4 beers at a time, average     Family History   Problem Relation Age of Onset     Anxiety Disorder Mother      Cataracts Paternal Grandmother          Current Outpatient Medications   Medication Sig Dispense Refill  "    losartan (COZAAR) 50 MG tablet Take 1 tablet (50 mg) by mouth daily 90 tablet 3     No Known Allergies    Reviewed and updated as needed this visit by clinical staff  Tobacco  Allergies  Meds  Problems  Med Hx  Surg Hx  Fam Hx  Soc Hx        Reviewed and updated as needed this visit by Provider  Tobacco  Allergies  Meds  Problems  Med Hx  Surg Hx  Fam Hx         ROS:  Constitutional, HEENT, cardiovascular, pulmonary, gi and gu systems are negative, except as otherwise noted.    OBJECTIVE:     /74   Pulse 76   Temp 98.8  F (37.1  C) (Oral)   Ht 1.835 m (6' 0.25\")   Wt 89.4 kg (197 lb)   BMI 26.53 kg/m    Body mass index is 26.53 kg/m .  GENERAL: healthy, alert and no distress  EYES: Eyes grossly normal to inspection, PERRL and conjunctivae and sclerae normal  NECK: no adenopathy, no asymmetry, masses, or scars and thyroid normal to palpation  RESP: lungs clear to auscultation - no rales, rhonchi or wheezes  CV: regular rate and rhythm, normal S1 S2, no S3 or S4, no murmur, click or rub, no peripheral edema and peripheral pulses strong  MS: no gross musculoskeletal defects noted, no edema  NEURO: Normal strength and tone, mentation intact and speech normal  PSYCH: mentation appears normal, affect normal/bright    Diagnostic Test Results:  none     ASSESSMENT/PLAN:             1. Hypertension, benign essential, goal below 140/90  Under improved control on the losartan with decrease in side effects/cough/related to the lisinopril.  Refilled for 1 year.  Recheck is recommended in approximately 6 months for routine adult physical.  - losartan (COZAAR) 50 MG tablet; Take 1 tablet (50 mg) by mouth daily  Dispense: 90 tablet; Refill: 3    See Patient Instructions    Barrera Frazier MD  [facility]    "

## 2018-12-31 ENCOUNTER — TRANSFERRED RECORDS (OUTPATIENT)
Dept: HEALTH INFORMATION MANAGEMENT | Facility: CLINIC | Age: 53
End: 2018-12-31

## 2019-01-07 ENCOUNTER — DOCUMENTATION ONLY (OUTPATIENT)
Dept: OPHTHALMOLOGY | Facility: CLINIC | Age: 54
End: 2019-01-07

## 2019-03-07 ENCOUNTER — TELEPHONE (OUTPATIENT)
Dept: FAMILY MEDICINE | Facility: CLINIC | Age: 54
End: 2019-03-07

## 2019-03-07 NOTE — LETTER
March 7, 2019    Jose G Pena  [address] [place] [address]    Dear Jose G    We care about your health and have reviewed your health plan. We have reviewed your medical conditions, medication list, and lab results and are making recommendations based on this review, to better manage your health.    You are in particular need of attention regarding:  - Completing a Colon Cancer Screening (FIT) - Please complete FIT test that you picked up from the lab on 8/27/18 and mail completed test to clinic.    Here is a list of Health Maintenance topics that are due now or due soon:  Health Maintenance Due   Topic Date Due     FIT Q1 YR  12/05/1977     HIV SCREEN (SYSTEM ASSIGNED)  12/05/1985     DTAP/TDAP/TD IMMUNIZATION (1 - Tdap) 12/05/1992     ZOSTER IMMUNIZATION (1 of 2) 12/05/2017     INFLUENZA VACCINE (1) 09/01/2018     PREVENTIVE CARE VISIT  12/28/2018     PHQ-2 Q1 YR  12/28/2018     We will be calling you in the next couple of weeks to help you schedule any appointments that are needed.  Please call us at [phone] (or use [employer]) to address the above recommendations.     Thank you for trusting [facility] [place] and we appreciate the opportunity to serve you.  We look forward to supporting your healthcare needs in the future.    Healthy Regards,    Dr. Frazier/ele

## 2019-03-07 NOTE — TELEPHONE ENCOUNTER
Panel Management Review      Patient has the following on his problem list:     Hypertension   Last three blood pressure readings:  BP Readings from Last 3 Encounters:   12/10/18 134/74   08/27/18 134/76   07/16/18 148/90     Blood pressure: Passed    HTN Guidelines:  Age 18-59 BP range:  Less than 140/90  Age 60-85 with Diabetes:  Less than 140/90  Age 60-85 without Diabetes:  less than 150/90      Composite cancer screening  Chart review shows that this patient is due/due soon for the following Fecal Colorectal (FIT)  Summary:    Patient is due/failing the following:   FIT    Action needed:   FIT ordered on 8/27/18    Type of outreach:    Sent letter. 3/7/19    Questions for provider review:    None                                                                                                                                    [facility]        Chart routed to Care Team .

## 2019-03-22 PROCEDURE — 82274 ASSAY TEST FOR BLOOD FECAL: CPT | Performed by: FAMILY MEDICINE

## 2019-03-23 LAB — HEMOCCULT STL QL IA: NEGATIVE

## 2019-03-25 DIAGNOSIS — Z12.11 SCREEN FOR COLON CANCER: ICD-10-CM

## 2019-08-03 ENCOUNTER — NURSE TRIAGE (OUTPATIENT)
Dept: NURSING | Facility: CLINIC | Age: 54
End: 2019-08-03

## 2019-08-04 NOTE — TELEPHONE ENCOUNTER
Patient's wife (Martha) calling to report they are on vacation in [place] and patient forgot his Losartan medication at home. FNA advised to have the pharmacy patient is using in [place] to call the [facility] pharmacy tomorrow morning to get prescription transferred. Wife was upset said that would be difficult with the time change as they are 3 hours behind. FNA advised if they call when they wake up at for example 7 am, then it would be 10 am our time. Advised [place] pharmacy is open from 8 am-4 pm.    Krys Melgar RN/[facility]    Reason for Disposition    Caller has NON-URGENT medication question about med that PCP prescribed and triager unable to answer question    Protocols used: MEDICATION QUESTION CALL-A-AH

## 2020-09-17 DIAGNOSIS — I10 HYPERTENSION, BENIGN ESSENTIAL, GOAL BELOW 140/90: ICD-10-CM

## 2020-09-21 RX ORDER — LOSARTAN POTASSIUM 50 MG/1
50 TABLET ORAL DAILY
Qty: 90 TABLET | Refills: 3 | Status: SHIPPED | OUTPATIENT
Start: 2020-09-21

## 2020-09-21 NOTE — TELEPHONE ENCOUNTER
"Requested Prescriptions   Pending Prescriptions Disp Refills     losartan (COZAAR) 50 MG tablet 90 tablet 3     Sig: Take 1 tablet (50 mg) by mouth daily       Angiotensin-II Receptors Failed - 9/21/2020  7:53 AM        Failed - Last blood pressure under 140/90 in past 12 months     BP Readings from Last 3 Encounters:   12/10/18 134/74   08/27/18 134/76   07/16/18 148/90                 Failed - Recent (12 mo) or future (30 days) visit within the authorizing provider's specialty     Patient has had an office visit with the authorizing provider or a provider within the authorizing providers department within the previous 12 mos or has a future within next 30 days. See \"Patient Info\" tab in inbasket, or \"Choose Columns\" in Meds & Orders section of the refill encounter.              Failed - Normal serum creatinine on file in past 12 months     Recent Labs   Lab Test 08/27/18  1043   CR 0.84       Ok to refill medication if creatinine is low          Failed - Normal serum potassium on file in past 12 months     Recent Labs   Lab Test 08/27/18  1043   POTASSIUM 4.3                    Passed - Medication is active on med list        Passed - Patient is age 18 or older             Patient  Has not been seen since 12/10/18.    Needs OV.    Please send back to RN after appointment is made.  Thank-you,    Bárbara Davis,RN,BSN  [facility]    "

## 2020-09-25 ENCOUNTER — OFFICE VISIT (OUTPATIENT)
Dept: OPHTHALMOLOGY | Facility: CLINIC | Age: 55
End: 2020-09-25
Payer: COMMERCIAL

## 2020-09-25 DIAGNOSIS — H52.4 PRESBYOPIA: ICD-10-CM

## 2020-09-25 DIAGNOSIS — H34.8322 STABLE BRANCH RETINAL VEIN OCCLUSION OF LEFT EYE (H): ICD-10-CM

## 2020-09-25 DIAGNOSIS — Z01.00 EXAMINATION OF EYES AND VISION: Primary | ICD-10-CM

## 2020-09-25 PROCEDURE — 92015 DETERMINE REFRACTIVE STATE: CPT | Performed by: OPHTHALMOLOGY

## 2020-09-25 PROCEDURE — 92004 COMPRE OPH EXAM NEW PT 1/>: CPT | Performed by: OPHTHALMOLOGY

## 2020-09-25 ASSESSMENT — REFRACTION_WEARINGRX
SPECS_TYPE: PAL
OS_CYLINDER: SPHERE
OS_SPHERE: -2.25
OD_CYLINDER: SPHERE
OS_ADD: +2.00
OD_ADD: +2.00
OD_SPHERE: -2.25

## 2020-09-25 ASSESSMENT — REFRACTION_MANIFEST
OD_SPHERE: -2.00
OD_CYLINDER: SPHERE
OS_AXIS: 086
OS_CYLINDER: +0.25
OS_SPHERE: -2.00
OS_ADD: +2.25
OD_ADD: +2.25

## 2020-09-25 ASSESSMENT — CONF VISUAL FIELD
OD_NORMAL: 1
OS_NORMAL: 1

## 2020-09-25 ASSESSMENT — VISUAL ACUITY
OS_SC: 1-
CORRECTION_TYPE: GLASSES
METHOD: SNELLEN - LINEAR
OD_CC: 20/20
OD_SC: 1
OD_CC+: -2
OS_CC: 20/20
OS_CC+: -1

## 2020-09-25 ASSESSMENT — TONOMETRY
IOP_METHOD: APPLANATION
OD_IOP_MMHG: 12
OS_IOP_MMHG: 13

## 2020-09-25 NOTE — PATIENT INSTRUCTIONS
Glasses prescription given - optional  Use artificial tears up to 4 times daily both eyes.  (Refresh Tears, Systane Ultra/Balance, or Theratears)  Encouraged to discuss smoking cessation with PCP.   Call in May 2021 for an appointment in September 2021 for Complete Exam.    Dr. Butts [phone]\

## 2020-09-25 NOTE — PROGRESS NOTES
Current Eye Medications:  None.      Subjective:  Comprehensive Eye Exam.  History of vein occlusion left eye.   Distance vision remains about the same.  He now has to remove his glasses to read.    Not actively followed at [facility].      Objective:  See Ophthalmology Exam.       Assessment:  Progressive presbyopia.  Previous branch retinal vein occlusion left eye improved.      ICD-10-CM    1. Examination of eyes and vision  Z01.00    2. Presbyopia  H52.4 REFRACTIVE STATUS   3. Stable branch retinal vein occlusion of left eye  H34.8322         Plan:  Glasses prescription given - optional  Use artificial tears up to 4 times daily both eyes.  (Refresh Tears, Systane Ultra/Balance, or Theratears)  Encouraged to discuss smoking cessation with PCP.   Call in May 2021 for an appointment in September 2021 for Complete Exam.    Dr. Butts [phone]\

## 2020-09-25 NOTE — LETTER
9/25/2020         RE: Jose G Pena  [address]  [place] [address]        Dear Colleague,    Thank you for referring your patient, Jose G Pena, to the [facility]. Please see a copy of my visit note below.     Current Eye Medications:  None.      Subjective:  Comprehensive Eye Exam.  History of vein occlusion left eye.   Distance vision remains about the same.  He now has to remove his glasses to read.    Not actively followed at [facility].      Objective:  See Ophthalmology Exam.       Assessment:  Progressive presbyopia.  Previous branch retinal vein occlusion left eye improved.      ICD-10-CM    1. Examination of eyes and vision  Z01.00    2. Presbyopia  H52.4 REFRACTIVE STATUS   3. Stable branch retinal vein occlusion of left eye  H34.8322         Plan:  Glasses prescription given - optional  Use artificial tears up to 4 times daily both eyes.  (Refresh Tears, Systane Ultra/Balance, or Theratears)  Encouraged to discuss smoking cessation with PCP.   Call in May 2021 for an appointment in September 2021 for Complete Exam.    Dr. Butts [phone]\         Again, thank you for allowing me to participate in the care of your patient.        Sincerely,        Yovani Butts MD

## 2020-09-26 ASSESSMENT — SLIT LAMP EXAM - LIDS
COMMENTS: NORMAL
COMMENTS: NORMAL

## 2020-09-26 ASSESSMENT — CUP TO DISC RATIO
OS_RATIO: 0.5
OD_RATIO: 0.5

## 2020-09-26 ASSESSMENT — EXTERNAL EXAM - RIGHT EYE: OD_EXAM: NORMAL

## 2020-11-11 ENCOUNTER — VIRTUAL VISIT (OUTPATIENT)
Dept: FAMILY MEDICINE | Facility: OTHER | Age: 55
End: 2020-11-11
Payer: COMMERCIAL

## 2020-11-11 DIAGNOSIS — Z20.822 SUSPECTED COVID-19 VIRUS INFECTION: Primary | ICD-10-CM

## 2020-11-11 PROCEDURE — 99421 OL DIG E/M SVC 5-10 MIN: CPT | Performed by: NURSE PRACTITIONER

## 2020-11-11 NOTE — PROGRESS NOTES
"Date: 2020 12:50:56  Clinician: Theresa Mayen  Clinician NPI: [id number]  Patient: ANM SUTHERLAND  Patient : 1965  Patient Address: [address] [place] [address] [place] [address]  Patient Phone: [phone]  Visit Protocol: URI  Patient Summary:  NAM is a 54 year old ( : 1965 ) male who initiated a [facility] Visit for COVID-19 (Coronavirus) evaluation and screening. When asked the question \"Please sign me up to receive news, health information and promotions from [facility].\", NAM responded \"Yes\".    When asked when his symptoms started, NAM reported that he does not have any symptoms.   He denies taking antibiotic medication in the past month and having recent facial or sinus surgery in the past 60 days.    Pertinent COVID-19 (Coronavirus) information  NAM does not work or volunteer as healthcare worker or a . In the past 14 days, NAM has not worked or volunteered at a healthcare facility or group living setting.   In the past 14 days, he also has not lived in a congregate living setting.   NAM has had a close contact with a laboratory-confirmed COVID-19 patient in the last 14 days. Date NAM was exposed to the laboratory-confirmed COVID-19 patient: 2020   Additional information about contact with COVID-19 (Coronavirus) patient as reported by the patient (free text): wife   NAM is living in the same household with the COVID-19 positive patient. He was in an enclosed space for greater than 15 minutes with the COVID-19 patient.   During the encounter, neither were wearing masks.   Since 2019, NAM has not been tested for COVID-19 and has not had upper respiratory infection or influenza-like illness.   Pertinent medical history  NAM needs a return to work/school note.   Weight: 185 lbs   NAM smokes or uses smokeless tobacco.   Weight: 185 lbs    MEDICATIONS: losartan oral, ALLERGIES: NKDA  Clinician Response:  Dear NAM,   Based on your exposure to COVID-19 (coronavirus), we " would like to test you for this virus.  1. Please call [phone] to schedule your visit. Explain that you were referred by [facility] to have a COVID-19 test. Be [facility].  * If you need to schedule in [facility] please call [phone] or for [employer] employees please call [phone].   * If you need to schedule in the [place] area please call [phone]. [place] employees call [phone].   The following will serve as your written order for this COVID Test, ordered by me, for the indication of suspected COVID [Z20.828]: The test will be ordered in [employer], our electronic health record, after you are scheduled. It will show as ordered and authorized by Jose Jacob MD.  Order: COVID-19 (coronavirus) PCR for ASYMPTOMATIC EXPOSURE testing from [facility].   If you know you have had close contact with someone who tested positive, you should be quarantined for 14 days after this exposure. You should stay in quarantine for the14 days even if the covid test is negative, the optimal time to test after exposure is 5-7 days from the exposure  Quarantine means   What should I do?  For safety, it's very important to follow these rules. Do this for 14 days after the date you were last exposed to the virus..  Stay home and away from others. Don't go to school or anywhere else. Generally quarantine means staying home from work but there are some exceptions to this. Please contact your workplace.   No hugging, kissing or shaking hands.  Don't let anyone visit.  Cover your mouth and nose with a mask, tissue or washcloth to avoid spreading germs.  Wash your hands and face often. Use soap and water.  What are the symptoms of COVID-19?  The most common symptoms are cough, fever and trouble breathing. Less common symptoms include headache, body aches, fatigue (feeling very tired), chills, sore throat, stuffy or runny nose, diarrhea (loose poop), loss of taste or smell, belly pain, and nausea or  vomiting (feeling sick to your stomach or throwing up).  After 14 days, if you have still don't have symptoms, you likely don't have this virus.  If you develop symptoms, follow these guidelines.  If you're normally healthy: Please start another [facility] visit to report your symptoms. Go to [email or web address].  If you have a serious health problem (like cancer, heart failure, an organ transplant or kidney disease): Call your specialty clinic. Let them know that you might have COVID-19.  2. When it's time for your COVID test:  Stay at least 6 feet away from others. (If someone will drive you to your test, stay in the backseat, as far away from the  as you can.)  Cover your mouth and nose with a mask, tissue or washcloth.  Go straight to the testing site. Don't make any stops on the way there or back.  Please note  Caregivers in these groups are at risk for severe illness due to COVID-19:  o People 65 years and older  o People who live in a nursing home or long-term care facility  o People with chronic disease (lung, heart, cancer, diabetes, kidney, liver, immunologic)  o People who have a weakened immune system, including those who:  Are in cancer treatment  Take medicine that weakens the immune system, such as corticosteroids  Had a bone marrow or organ transplant  Have an immune deficiency  Have poorly controlled HIV or AIDS  Are obese (body mass index of 40 or higher)  Smoke regularly  Where can I get more information?  [facility] -- About COVID-19: [email or web address]  CDC -- What to Do If You're Sick: www.cdc.gov/coronavirus/2019-ncov/about/steps-when-sick.html  CDC -- Ending Home Isolation: www.cdc.gov/coronavirus/2019-ncov/hcp/disposition-in-home-patients.html  CDC -- Caring for Someone: www.cdc.gov/coronavirus/2019-ncov/if-you-are-sick/care-for-someone.html  [facility] -- Interim Guidance for Hospital Discharge to Home: www.health.[facility].[place]/diseases/coronavirus/hcp/hospdischarge.pdf  [facility]  [place] clinical trials (COVID-19 research studies): clinicalaffairs.[employer].[facility]/[employer]-clinical-trials  Below are the COVID-19 hotlines at the [facility] ([facility]). Interpreters are available.  For health questions: Call 817-458-4388 or 1-238.531.6487 (7 a.m. to 7 p.m.)  For questions about schools and childcare: Call 989-965-9625 or 1-132.429.7691 (7 a.m. to 7 p.m.)    Diagnosis: Contact with and (suspected) exposure to other viral communicable diseases  Diagnosis ICD: Z20.828

## 2020-11-12 DIAGNOSIS — Z20.822 SUSPECTED COVID-19 VIRUS INFECTION: ICD-10-CM

## 2020-11-12 PROCEDURE — U0003 INFECTIOUS AGENT DETECTION BY NUCLEIC ACID (DNA OR RNA); SEVERE ACUTE RESPIRATORY SYNDROME CORONAVIRUS 2 (SARS-COV-2) (CORONAVIRUS DISEASE [COVID-19]), AMPLIFIED PROBE TECHNIQUE, MAKING USE OF HIGH THROUGHPUT TECHNOLOGIES AS DESCRIBED BY CMS-2020-01-R: HCPCS | Performed by: FAMILY MEDICINE

## 2020-11-13 LAB
SARS-COV-2 RNA SPEC QL NAA+PROBE: ABNORMAL
SPECIMEN SOURCE: ABNORMAL

## 2020-12-13 ENCOUNTER — HEALTH MAINTENANCE LETTER (OUTPATIENT)
Age: 55
End: 2020-12-13

## 2021-06-14 ENCOUNTER — IMMUNIZATION (OUTPATIENT)
Dept: LAB | Facility: CLINIC | Age: 56
End: 2021-06-14
Payer: COMMERCIAL

## 2021-09-26 ENCOUNTER — HEALTH MAINTENANCE LETTER (OUTPATIENT)
Age: 56
End: 2021-09-26

## 2022-01-16 ENCOUNTER — HEALTH MAINTENANCE LETTER (OUTPATIENT)
Age: 57
End: 2022-01-16

## 2022-06-27 NOTE — TELEPHONE ENCOUNTER
Returning patients call left a voice message for patient to call [phone] to schedule   Sarecycline Counseling: Patient advised regarding possible photosensitivity and discoloration of the teeth, skin, lips, tongue and gums.  Patient instructed to avoid sunlight, if possible.  When exposed to sunlight, patients should wear protective clothing, sunglasses, and sunscreen.  The patient was instructed to call the office immediately if the following severe adverse effects occur:  hearing changes, easy bruising/bleeding, severe headache, or vision changes.  The patient verbalized understanding of the proper use and possible adverse effects of sarecycline.  All of the patient's questions and concerns were addressed.

## 2022-11-30 NOTE — ANESTHESIA PREPROCEDURE EVALUATION
Anesthesia Evaluation     . Pt has had prior anesthetic. Type: General and Regional    No history of anesthetic complications     ROS/MED HX    ENT/Pulmonary:     (+)tobacco use, Current use 1 packs/day  , . .    Neurologic:  - neg neurologic ROS     Cardiovascular:  - neg cardiovascular ROS       METS/Exercise Tolerance:     Hematologic:  - neg hematologic  ROS       Musculoskeletal:  - neg musculoskeletal ROS       GI/Hepatic:  - neg GI/hepatic ROS       Renal/Genitourinary:  - ROS Renal section negative       Endo:  - neg endo ROS       Psychiatric:  - neg psychiatric ROS       Infectious Disease:  - neg infectious disease ROS       Malignancy:      - no malignancy   Other:    - neg other ROS           Physical Exam  Normal systems: cardiovascular, pulmonary and dental    Airway   Mallampati: I  TM distance: >3 FB  Neck ROM: full    Dental     Cardiovascular       Pulmonary    breath sounds clear to auscultation                    Anesthesia Plan      History & Physical Review  History and physical reviewed and following examination; no interval change.    ASA Status:  2 .    NPO Status:  > 6 hours    Plan for General, ETT, Periph. Nerve Block for postop pain and Peripheral Nerve Block with Intravenous and Propofol induction. Maintenance will be Balanced.    PONV prophylaxis:  Ondansetron (or other 5HT-3) and Dexamethasone or Solumedrol       Postoperative Care  Postoperative pain management:  Multi-modal analgesia and Peripheral nerve block (Single Shot).      Consents  Anesthetic plan, risks, benefits and alternatives discussed with:  Patient and Spouse..                          .  
negative...

## 2023-04-23 ENCOUNTER — HEALTH MAINTENANCE LETTER (OUTPATIENT)
Age: 58
End: 2023-04-23

## 2024-01-31 ENCOUNTER — APPOINTMENT (RX ONLY)
Dept: URBAN - NONMETROPOLITAN AREA CLINIC 9 | Facility: CLINIC | Age: 59
Setting detail: DERMATOLOGY
End: 2024-01-31

## 2024-01-31 DIAGNOSIS — L20.89 OTHER ATOPIC DERMATITIS: ICD-10-CM

## 2024-01-31 PROCEDURE — ? TREATMENT REGIMEN

## 2024-01-31 PROCEDURE — ? COUNSELING

## 2024-01-31 PROCEDURE — ? IN-HOUSE DISPENSING PHARMACY

## 2024-01-31 PROCEDURE — ? INVENTORY

## 2024-01-31 PROCEDURE — 99203 OFFICE O/P NEW LOW 30 MIN: CPT

## 2024-01-31 ASSESSMENT — LOCATION DETAILED DESCRIPTION DERM
LOCATION DETAILED: RIGHT ANTERIOR PROXIMAL THIGH
LOCATION DETAILED: LEFT ANTERIOR PROXIMAL THIGH
LOCATION DETAILED: LEFT PROXIMAL DORSAL FOREARM
LOCATION DETAILED: RIGHT PROXIMAL DORSAL FOREARM
LOCATION DETAILED: RIGHT SUPERIOR MEDIAL LOWER BACK
LOCATION DETAILED: RIGHT POSTERIOR ANKLE

## 2024-01-31 ASSESSMENT — LOCATION SIMPLE DESCRIPTION DERM
LOCATION SIMPLE: RIGHT THIGH
LOCATION SIMPLE: LEFT FOREARM
LOCATION SIMPLE: RIGHT ANKLE
LOCATION SIMPLE: LEFT THIGH
LOCATION SIMPLE: RIGHT FOREARM
LOCATION SIMPLE: RIGHT LOWER BACK

## 2024-01-31 ASSESSMENT — LOCATION ZONE DERM
LOCATION ZONE: TRUNK
LOCATION ZONE: ARM
LOCATION ZONE: LEG

## 2024-01-31 NOTE — HPI: RASH
What Type Of Note Output Would You Prefer (Optional)?: Standard Output
Is This A New Presentation, Or A Follow-Up?: Rash
Additional History: He’s tried OTC lotions but they aren’t clearing rash or relieving itch.

## 2024-01-31 NOTE — PROCEDURE: MIPS QUALITY
Quality 226: Preventive Care And Screening: Tobacco Use: Screening And Cessation Intervention: Tobacco Screening not Performed
Detail Level: Detailed
Quality 431: Preventive Care And Screening: Unhealthy Alcohol Use - Screening: Patient identified as an unhealthy alcohol user when screened for unhealthy alcohol use using a systematic screening method and received brief counseling

## 2024-01-31 NOTE — PROCEDURE: IN-HOUSE DISPENSING PHARMACY
Product 34 Units Dispensed: 0
Product 3 Application Directions: Apply sparingly topically to the affected areas 1-2 times daily as directed.
Product 64 Unit Type: mg
Product 32 Price/Unit (In Dollars): 50
Product 25 Application Directions: Apply sparingly topically to the affected areas 1-2 times daily, taper as directed.
Product 22 Unit Type: grams
Name Of Product 23: Dermatitis Topical Solution\\n(Clobetasol Propionate 0.05%, Niacinamide 4%
Product 53 Application Directions: Improves the appearance of dark spots, melasma, hyperpigmentation, sun damage. Use daily, morning or evening.
Product 1 Amount/Unit (Numbers Only): 30
Name Of Product 26: Psoriasis Cream\\n(Calcipotriene 0.005%, Niacinamide 4%)
Product 53 Unit Type: bottle(s)
Detail Level: Zone
Name Of Product 54: Hyaluronic Acid Gel
Product 12 Amount/Unit (Numbers Only): 15
Product 23 Application Directions: Apply sparingly topically to the affected areas 3 times per week, taper as directed.
Product 2 Application Directions: Wash affected areas daily or as directed.
Product 41 Amount/Unit (Numbers Only): 60
Product 54 Amount/Unit (Numbers Only): 1
Name Of Product 22: Dermatitis Cream\\n(Clobetasol Propionate 0.05%, Niacinamide 4%)
Product 52 Application Directions: Brightens the skin adding a healthy glow. Protects from pollution and environmental damage. Use daily, morning or evening.
Name Of Product 3: Rosacea Silicone Gel\\n(Metronidazole 1%, Ivermectin 1%, Potassium Azeloyl Diglycinate 5%, Niacinamide 4%)
Product 22 Application Directions: Apply sparingly topically to the affected areas 1-2 times a day M-F, taper as directed.
Name Of Product 53: Yara
Name Of Product 25: Dermatitis Foam\\n(Clobetasol Propionate 0.05%, Calcipotriene 0.005%)
Name Of Product 32: Actinic Keratosis Gel / Wart Gel\\n(Imiquimod 5%, Niacinamide 2%, Levocetirizine Dihydrochloride 1%)
Product 51 Application Directions: Brighten the skin and exfoliate. For anti-aging or acne prone skin. Use 2-4 nights a week after cleansing and before serums/creams. Rinse off.
Product 1 Application Directions: Apply sparingly topically to acne affected zones daily as directed.
Name Of Product 41: Alopecia Topical Solution for Men HP\\n(Minoxidil 7%, Finasteride 0.1%)
Name Of Product 12: Anti-Fungal Nail Solution\\n(Fluconazole 4%, Ibuprofen 2%, Itraconazole 1%, Terbinafine HCL 4%)
Product 32 Application Directions: Apply sparingly topically to the affected areas daily as directed.
Name Of Product 21: Dermatitis Cream\\n(Fluocinolone Acetonide 0.01%, Niacinamide 4%)
Name Of Product 2: Acne Body Wash\\n(Salicylic Acid 5%, Sodium Sulfacetamide Monohydrate 10%, Sulfur Precipitated 2.75%)
Product 51 Unit Type: unit(s)
Product 12 Application Directions: Apply topically to the affected nail(s) and surrounding areas twice daily as directed.
Name Of Product 52: Antioxidant C Serum
Send Charges To Patient Encounter: No
Product 26 Application Directions: Apply sparingly to all affected areas as directed 1-2 times per day.
Name Of Product 31: Wart Corn and Callus Cream\\n(Cimetidine 10%, Lidocaine 5%, Salicylic Acid 40%)
Name Of Product 11: Anti-Fungal Cream\\n(Ciclopirox 3%, Itraconazole 5%, Urea 20%, DMSO 5%)
Product 2 Amount/Unit (Numbers Only): 120
Name Of Product 24: Dermatitis Topical Solution\\n(Clobetasol Propionate 0.05%, Niacinamide 4%)
Product 41 Application Directions: Apply topically to the affected areas 1 or 2 times daily or as directed.
Product 54 Application Directions: Oil-free moisturizing gel. Good for plumping skin with wrinkles, crepiness and uneven texture. Use daily, morning or evening.
Product 31 Application Directions: Apply topically to the affected areas 1-2 times daily as directed.
Render Refills If Set To 0: Yes
Name Of Product 51: AHA + BHA Peel Pads
Name Of Product 1: Acne Gel\\n(Adapalene 0.3%, Benzoyl Peroxide 2.5%, Niacinamide 4%)
Product 24 Application Directions: Patient is to mix with 14 ounces of Skin MD.\\nApply sparingly topically to the affected areas 5-10 times per week, taper as directed.

## 2024-03-20 ENCOUNTER — APPOINTMENT (RX ONLY)
Dept: URBAN - NONMETROPOLITAN AREA CLINIC 9 | Facility: CLINIC | Age: 59
Setting detail: DERMATOLOGY
End: 2024-03-20

## 2024-03-20 DIAGNOSIS — L20.89 OTHER ATOPIC DERMATITIS: ICD-10-CM

## 2024-03-20 PROCEDURE — ? COUNSELING

## 2024-03-20 PROCEDURE — 99213 OFFICE O/P EST LOW 20 MIN: CPT

## 2024-03-20 PROCEDURE — ? TREATMENT REGIMEN

## 2024-03-20 ASSESSMENT — LOCATION ZONE DERM
LOCATION ZONE: LEG
LOCATION ZONE: ARM

## 2024-03-20 ASSESSMENT — LOCATION DETAILED DESCRIPTION DERM
LOCATION DETAILED: RIGHT POSTERIOR ANKLE
LOCATION DETAILED: RIGHT PROXIMAL DORSAL FOREARM
LOCATION DETAILED: LEFT PROXIMAL DORSAL FOREARM

## 2024-03-20 ASSESSMENT — LOCATION SIMPLE DESCRIPTION DERM
LOCATION SIMPLE: RIGHT ANKLE
LOCATION SIMPLE: RIGHT FOREARM
LOCATION SIMPLE: LEFT FOREARM

## (undated) DEVICE — SU VICRYL 0 CT-1 27" J340H

## (undated) DEVICE — GLOVE PROTEXIS BLUE W/NEU-THERA 7.5  2D73EB75

## (undated) DEVICE — SU VICRYL 2-0 SH 27" UND J417H

## (undated) DEVICE — NDL BLUNT 18GA 1.5" FILTER 305211

## (undated) DEVICE — SU PROLENE 3-0 PS-2 18" 8687H

## (undated) DEVICE — PREP CHLORAPREP 26ML TINTED ORANGE  260815

## (undated) DEVICE — SUCTION TIP YANKAUER W/O VENT K86

## (undated) DEVICE — DRAPE STERI TOWEL SM 1000

## (undated) DEVICE — GLOVE PROTEXIS W/NEU-THERA 7.0  2D73TE70

## (undated) DEVICE — BLADE SAW LONG WIDEÂ 13MMX34.5MM 2296-003-106

## (undated) DEVICE — DRSG STERI STRIP 1/2X4" R1547

## (undated) DEVICE — DRSG ABDOMINAL 07 1/2X8" 7197D

## (undated) DEVICE — DRSG GAUZE 4X4" TRAY 6939

## (undated) DEVICE — SU NDL MAYO 1/2 CIRCLE TROCAR 1826-4D

## (undated) DEVICE — GLOVE PROTEXIS POWDER FREE 7.5 ORTHOPEDIC 2D73ET75

## (undated) DEVICE — BNDG COBAN 4"X5YDS STERILE

## (undated) DEVICE — GLOVE PROTEXIS W/NEU-THERA 7.5  2D73TE75

## (undated) DEVICE — DRAPE STOCKINETTE IMPERVIOUS 12" 1587

## (undated) DEVICE — PACK EXTREMITY SOP15EXFSD

## (undated) DEVICE — SU NDL MAYO SZ 5 1824-5D

## (undated) DEVICE — SU VICRYL 1 CT-1 27" J341H

## (undated) DEVICE — NDL 19GA 1.5"

## (undated) DEVICE — NDL 22GA 1.5"

## (undated) DEVICE — DRAPE IOBAN INCISE 23X17" 6650EZ